# Patient Record
Sex: FEMALE | Race: WHITE | Employment: UNEMPLOYED | URBAN - METROPOLITAN AREA
[De-identification: names, ages, dates, MRNs, and addresses within clinical notes are randomized per-mention and may not be internally consistent; named-entity substitution may affect disease eponyms.]

---

## 2017-05-25 ENCOUNTER — APPOINTMENT (OUTPATIENT)
Dept: GENERAL RADIOLOGY | Age: 27
End: 2017-05-25
Attending: EMERGENCY MEDICINE
Payer: MEDICAID

## 2017-05-25 ENCOUNTER — HOSPITAL ENCOUNTER (EMERGENCY)
Age: 27
Discharge: HOME | End: 2017-05-25
Attending: EMERGENCY MEDICINE
Payer: MEDICAID

## 2017-05-25 VITALS
SYSTOLIC BLOOD PRESSURE: 112 MMHG | TEMPERATURE: 98.2 F | WEIGHT: 165 LBS | HEART RATE: 98 BPM | RESPIRATION RATE: 26 BRPM | DIASTOLIC BLOOD PRESSURE: 74 MMHG | HEIGHT: 62 IN | OXYGEN SATURATION: 98 % | BODY MASS INDEX: 30.36 KG/M2

## 2017-05-25 DIAGNOSIS — Z72.0 TOBACCO USE: ICD-10-CM

## 2017-05-25 DIAGNOSIS — J20.9 ACUTE BRONCHITIS WITH BRONCHOSPASM: Primary | ICD-10-CM

## 2017-05-25 PROCEDURE — 99283 EMERGENCY DEPT VISIT LOW MDM: CPT

## 2017-05-25 PROCEDURE — 71020 XR CHEST PA LAT: CPT

## 2017-05-25 PROCEDURE — 74011000250 HC RX REV CODE- 250: Performed by: EMERGENCY MEDICINE

## 2017-05-25 PROCEDURE — 77030013140 HC MSK NEB VYRM -A

## 2017-05-25 PROCEDURE — 74011250636 HC RX REV CODE- 250/636: Performed by: PHYSICIAN ASSISTANT

## 2017-05-25 PROCEDURE — 94640 AIRWAY INHALATION TREATMENT: CPT

## 2017-05-25 RX ORDER — ALBUTEROL SULFATE 1.25 MG/3ML
1.25 SOLUTION RESPIRATORY (INHALATION)
Qty: 25 EACH | Refills: 0 | Status: SHIPPED | OUTPATIENT
Start: 2017-05-25

## 2017-05-25 RX ORDER — ALBUTEROL SULFATE 90 UG/1
2 AEROSOL, METERED RESPIRATORY (INHALATION)
Qty: 1 INHALER | Refills: 1 | Status: SHIPPED | OUTPATIENT
Start: 2017-05-25

## 2017-05-25 RX ORDER — AZITHROMYCIN 250 MG/1
TABLET, FILM COATED ORAL
Qty: 6 TAB | Refills: 0 | Status: SHIPPED | OUTPATIENT
Start: 2017-05-25 | End: 2017-05-30

## 2017-05-25 RX ORDER — PREDNISONE 10 MG/1
TABLET ORAL
Qty: 43 TAB | Refills: 0 | Status: SHIPPED | OUTPATIENT
Start: 2017-05-25 | End: 2017-08-01

## 2017-05-25 RX ORDER — FLUTICASONE PROPIONATE AND SALMETEROL 250; 50 UG/1; UG/1
1 POWDER RESPIRATORY (INHALATION) EVERY 12 HOURS
COMMUNITY
End: 2017-07-06

## 2017-05-25 RX ORDER — IPRATROPIUM BROMIDE AND ALBUTEROL SULFATE 2.5; .5 MG/3ML; MG/3ML
3 SOLUTION RESPIRATORY (INHALATION)
Status: COMPLETED | OUTPATIENT
Start: 2017-05-25 | End: 2017-05-25

## 2017-05-25 RX ORDER — HYDROCODONE POLISTIREX AND CHLORPHENIRAMINE POLISTIREX 10; 8 MG/5ML; MG/5ML
5 SUSPENSION, EXTENDED RELEASE ORAL
Qty: 115 ML | Refills: 0 | Status: SHIPPED | OUTPATIENT
Start: 2017-05-25 | End: 2017-07-06

## 2017-05-25 RX ORDER — DEXAMETHASONE 4 MG/1
10 TABLET ORAL ONCE
Status: COMPLETED | OUTPATIENT
Start: 2017-05-25 | End: 2017-05-25

## 2017-05-25 RX ADMIN — IPRATROPIUM BROMIDE AND ALBUTEROL SULFATE 3 ML: .5; 3 SOLUTION RESPIRATORY (INHALATION) at 16:34

## 2017-05-25 RX ADMIN — DEXAMETHASONE 10 MG: 4 TABLET ORAL at 17:44

## 2017-05-25 NOTE — DISCHARGE INSTRUCTIONS
Bronchitis: Care Instructions  Your Care Instructions    Bronchitis is inflammation of the bronchial tubes, which carry air to the lungs. The tubes swell and produce mucus, or phlegm. The mucus and inflamed bronchial tubes make you cough. You may have trouble breathing. Most cases of bronchitis are caused by viruses like those that cause colds. Antibiotics usually do not help and they may be harmful. Bronchitis usually develops rapidly and lasts about 2 to 3 weeks in otherwise healthy people. Follow-up care is a key part of your treatment and safety. Be sure to make and go to all appointments, and call your doctor if you are having problems. It's also a good idea to know your test results and keep a list of the medicines you take. How can you care for yourself at home? · Take all medicines exactly as prescribed. Call your doctor if you think you are having a problem with your medicine. · Get some extra rest.  · Take an over-the-counter pain medicine, such as acetaminophen (Tylenol), ibuprofen (Advil, Motrin), or naproxen (Aleve) to reduce fever and relieve body aches. Read and follow all instructions on the label. · Do not take two or more pain medicines at the same time unless the doctor told you to. Many pain medicines have acetaminophen, which is Tylenol. Too much acetaminophen (Tylenol) can be harmful. · Take an over-the-counter cough medicine that contains dextromethorphan to help quiet a dry, hacking cough so that you can sleep. Avoid cough medicines that have more than one active ingredient. Read and follow all instructions on the label. · Breathe moist air from a humidifier, hot shower, or sink filled with hot water. The heat and moisture will thin mucus so you can cough it out. · Do not smoke. Smoking can make bronchitis worse. If you need help quitting, talk to your doctor about stop-smoking programs and medicines. These can increase your chances of quitting for good.   When should you call for help? Call 911 anytime you think you may need emergency care. For example, call if:  · You have severe trouble breathing. Call your doctor now or seek immediate medical care if:  · You have new or worse trouble breathing. · You cough up dark brown or bloody mucus (sputum). · You have a new or higher fever. · You have a new rash. Watch closely for changes in your health, and be sure to contact your doctor if:  · You cough more deeply or more often, especially if you notice more mucus or a change in the color of your mucus. · You are not getting better as expected. Where can you learn more? Go to http://ralph-júnior.info/. Enter H333 in the search box to learn more about \"Bronchitis: Care Instructions. \"  Current as of: May 23, 2016  Content Version: 11.2  © 0248-3969 [employer]. Care instructions adapted under license by [employer] (which disclaims liability or warranty for this information). If you have questions about a medical condition or this instruction, always ask your healthcare professional. [address] any warranty or liability for your use of this information.

## 2017-05-25 NOTE — ED PROVIDER NOTES
HPI Comments: 4:18 PM   Vandana Armendariz is a 27 y.o. Female with hx of asthma presenting to the ED c/o cough productive of yellow and gray sputum x 6 days. Associated sxs include rhinorrhea, sore throat, and chest tightness. Reports she has been evaluated by her PCP who rx'd Robitussin and she has not had relief with this. Pt also has Nebulizer at home, and inhaler. Reports positive sick contacts at home. Admits to smoking 1.5 ppd cigarettes x 15 years, and states she is currently trying to quit for the 5th time. Denies fever, wheezing, and any other sxs or complaints. PCP: Hosea Butts MD      Patient is a 27 y.o. female presenting with cough. The history is provided by the patient. The history is limited by a developmental delay. Cough   This is a new problem. The current episode started more than 2 days ago. The cough is productive of sputum. There has been no fever. Associated symptoms include rhinorrhea and sore throat. Pertinent negatives include no chest pain, no chills, no headaches, no shortness of breath, no wheezing, no nausea and no vomiting. She is a smoker. Her past medical history is significant for asthma. Past Medical History:   Diagnosis Date    Asthma     Lung abnormality     Other and unspecified ovarian cysts        Past Surgical History:   Procedure Laterality Date    HX PELVIC LAPAROSCOPY           History reviewed. No pertinent family history. Social History     Social History    Marital status:      Spouse name: N/A    Number of children: N/A    Years of education: N/A     Occupational History    Not on file.      Social History Main Topics    Smoking status: Current Every Day Smoker     Packs/day: 1.50    Smokeless tobacco: Not on file    Alcohol use Yes    Drug use: Not on file    Sexual activity: Yes     Birth control/ protection: Pill     Other Topics Concern    Not on file     Social History Narrative         ALLERGIES: Latex    Review of Systems   Constitutional: Negative for chills and fever. HENT: Positive for rhinorrhea and sore throat. Negative for congestion. Respiratory: Positive for cough and chest tightness. Negative for shortness of breath and wheezing. Cardiovascular: Negative for chest pain. Gastrointestinal: Negative for nausea and vomiting. Genitourinary: Negative for decreased urine volume. Skin: Negative for rash. Neurological: Negative for light-headedness and headaches. Hematological: Negative for adenopathy. Vitals:    05/25/17 1605   BP: 112/74   Pulse: 98   Resp: 26   Temp: 98.2 °F (36.8 °C)   SpO2: 98%   Weight: 74.8 kg (165 lb)   Height: 5' 2\" (1.575 m)            Physical Exam   Constitutional: She is oriented to person, place, and time. She appears well-developed and well-nourished. No distress.  female in NAD. No resp distress or acc muscle use. HENT:   Head: Normocephalic and atraumatic. Right Ear: External ear normal. No swelling or tenderness. Tympanic membrane is not perforated, not erythematous and not bulging. Left Ear: External ear normal. No swelling or tenderness. Tympanic membrane is not perforated, not erythematous and not bulging. Nose: Mucosal edema and rhinorrhea present. Right sinus exhibits no maxillary sinus tenderness and no frontal sinus tenderness. Left sinus exhibits no maxillary sinus tenderness and no frontal sinus tenderness. Mouth/Throat: Uvula is midline and mucous membranes are normal. No oral lesions. No trismus in the jaw. No dental abscesses or uvula swelling. Posterior oropharyngeal erythema present. No oropharyngeal exudate, posterior oropharyngeal edema or tonsillar abscesses. Eyes: Conjunctivae are normal. Right eye exhibits no discharge. Left eye exhibits no discharge. No scleral icterus. Neck: Normal range of motion. Neck supple. Cardiovascular: Normal rate, regular rhythm, normal heart sounds and intact distal pulses.   Exam reveals no gallop and no friction rub. No murmur heard. Pulmonary/Chest: Effort normal and breath sounds normal. No accessory muscle usage. No tachypnea. No respiratory distress. She has no decreased breath sounds. She has no wheezes. She has no rhonchi. She has no rales. Musculoskeletal: Normal range of motion. Lymphadenopathy:     She has no cervical adenopathy. Neurological: She is alert and oriented to person, place, and time. Skin: Skin is warm and dry. No rash noted. She is not diaphoretic. No erythema. Psychiatric: She has a normal mood and affect. Judgment normal.   Nursing note and vitals reviewed. RESULTS:    XR CHEST PA LAT   Final Result   IMPRESSION:  Minimal linear atelectasis at the left lung base, without acute  radiographic cardiopulmonary abnormality. As read by the radiologist.         Labs Reviewed - No data to display    No results found for this or any previous visit (from the past 12 hour(s)). MDM  Number of Diagnoses or Management Options  Acute bronchitis with bronchospasm:   Tobacco use:   Diagnosis management comments: URI, strep, mono, sinusitis, influenza, PNA, bronchitis, asthma, allergic       Amount and/or Complexity of Data Reviewed  Tests in the radiology section of CPT®: ordered and reviewed (CXR)      ED Course     MEDICATIONS GIVEN:  Medications   albuterol-ipratropium (DUO-NEB) 2.5 MG-0.5 MG/3 ML (3 mL Nebulization Given 5/25/17 1634)   dexamethasone (DECADRON) tablet 10 mg (10 mg Oral Given 5/25/17 1744)     Procedures    PROGRESS NOTE:  4:18 PM  Initial assessment performed. Afebrile. Lungs CTAB. No resp distress or acc muscle use. Will tx for bronchitis with spasm. Need for smoking cessation discussed. Reasons to RTED discussed with pt. All questions answered. Pt feels comfortable going home at this time. Pt expressed understanding and she agrees with plan. DISCHARGE NOTE:  5:27 PM   Silvina Lockett's  results have been reviewed with her.   She has been counseled regarding her diagnosis, treatment, and plan. She verbally conveys understanding and agreement of the signs, symptoms, diagnosis, treatment and prognosis and additionally agrees to follow up as discussed. She also agrees with the care-plan and conveys that all of her questions have been answered. I have also provided discharge instructions for her that include: educational information regarding their diagnosis and treatment, and list of reasons why they would want to return to the ED prior to their follow-up appointment, should her condition change. The patient has been provided with education for proper Emergency Department utilization. CLINICAL IMPRESSION:    1. Acute bronchitis with bronchospasm    2. Tobacco use        PLAN: DISCHARGE HOME    Follow-up Information     Follow up With Details Comments Kelly Brito MD Schedule an appointment as soon as possible for a visit in 5 days for primary care follow up [address] [place] [address] [address] [facility] EMERGENCY DEPT Go to As needed, If symptoms worsen [address] [address]  [phone]          Discharge Medication List as of 5/25/2017  5:27 PM      START taking these medications    Details   azithromycin (ZITHROMAX Z-JESSICA) 250 mg tablet Take 2 pills on day 1 and 1 pill on days 2-5, Print, Disp-6 Tab, R-0      chlorpheniramine-HYDROcodone (TUSSIONEX PENNKINETIC ER) 10-8 mg/5 mL suspension Take 5 mL by mouth every twelve (12) hours as needed for Cough. Max Daily Amount: 10 mL., Print, Disp-115 mL, R-0      predniSONE (DELTASONE) 10 mg tablet Take 6 pills for 2 days, 5 pills for 2 days, 4 pills for 2 days, 3 pills for 2 days, 2 pills for 2 days, 1 pill for 2 days, 1/2 pill for 2 days. Take with food. , Print, Disp-43 Tab, R-0      albuterol (ACCUNEB) 1.25 mg/3 mL nebu Take 3 mL by inhalation every four (4) hours as needed (wheezing). , Print, Disp-25 Each, R-0 CONTINUE these medications which have CHANGED    Details   albuterol (PROVENTIL HFA, VENTOLIN HFA, PROAIR HFA) 90 mcg/actuation inhaler Take 2 Puffs by inhalation every four (4) hours as needed for Wheezing or Shortness of Breath., Print, Disp-1 Inhaler, R-1         CONTINUE these medications which have NOT CHANGED    Details   fluticasone-salmeterol (ADVAIR DISKUS) 250-50 mcg/dose diskus inhaler Take 1 Puff by inhalation every twelve (12) hours. , Historical Med      tiotropium (SPIRIVA WITH HANDIHALER) 18 mcg inhalation capsule Take 1 Cap by inhalation daily. , Historical Med      propranolol (INDERAL) 10 mg tablet Take  by mouth three (3) times daily. , Historical Med      zolpidem (AMBIEN) 5 mg tablet Take  by mouth nightly as needed. Historical Med      SUMAtriptan (IMITREX) 50 mg tablet Take 50 mg by mouth once as needed. Historical Med, 50 mg                 SCRIBE ATTESTATION STATEMENT  Documented by:Chasidy Guevara for and in the presence of Phil Brice PA-C.     PROVIDER ATTESTATION STATEMENT  I personally performed the services described in the documentation, reviewed the documentation, as recorded by the scribe in my presence, and it accurately and completely records my words and actions.   Phil Brice PA-C.

## 2017-07-05 PROBLEM — N28.1 RENAL CYST, ACQUIRED, LEFT: Status: ACTIVE | Noted: 2017-07-05

## 2017-07-05 PROBLEM — M54.50 CHRONIC LOW BACK PAIN: Status: ACTIVE | Noted: 2017-07-05

## 2017-07-05 PROBLEM — G89.29 CHRONIC LOW BACK PAIN: Status: ACTIVE | Noted: 2017-07-05

## 2017-07-06 PROBLEM — N32.81 OAB (OVERACTIVE BLADDER): Status: ACTIVE | Noted: 2017-07-06

## 2017-07-06 PROBLEM — R35.1 NOCTURIA: Status: ACTIVE | Noted: 2017-07-06

## 2017-07-06 PROBLEM — R39.14 FEELING OF INCOMPLETE BLADDER EMPTYING: Status: ACTIVE | Noted: 2017-07-06

## 2017-07-06 PROBLEM — R35.0 URINARY FREQUENCY: Status: ACTIVE | Noted: 2017-07-06

## 2017-07-06 PROBLEM — N39.3 SUI (STRESS URINARY INCONTINENCE, FEMALE): Status: ACTIVE | Noted: 2017-07-06

## 2017-07-06 PROBLEM — Z87.440 HISTORY OF RECURRENT UTIS: Status: ACTIVE | Noted: 2017-07-06

## 2017-08-01 ENCOUNTER — HOSPITAL ENCOUNTER (EMERGENCY)
Age: 27
Discharge: HOME | End: 2017-08-01
Attending: EMERGENCY MEDICINE
Payer: MEDICAID

## 2017-08-01 ENCOUNTER — APPOINTMENT (OUTPATIENT)
Dept: GENERAL RADIOLOGY | Age: 27
End: 2017-08-01
Attending: EMERGENCY MEDICINE
Payer: MEDICAID

## 2017-08-01 VITALS
HEIGHT: 63 IN | BODY MASS INDEX: 31.01 KG/M2 | WEIGHT: 175 LBS | SYSTOLIC BLOOD PRESSURE: 108 MMHG | OXYGEN SATURATION: 100 % | HEART RATE: 84 BPM | RESPIRATION RATE: 18 BRPM | TEMPERATURE: 98.3 F | DIASTOLIC BLOOD PRESSURE: 63 MMHG

## 2017-08-01 DIAGNOSIS — S90.32XA CONTUSION OF LEFT FOOT INCLUDING TOES, INITIAL ENCOUNTER: Primary | ICD-10-CM

## 2017-08-01 DIAGNOSIS — S90.122A CONTUSION OF LEFT FOOT INCLUDING TOES, INITIAL ENCOUNTER: Primary | ICD-10-CM

## 2017-08-01 PROCEDURE — 73630 X-RAY EXAM OF FOOT: CPT

## 2017-08-01 PROCEDURE — 99282 EMERGENCY DEPT VISIT SF MDM: CPT

## 2017-08-01 RX ORDER — KETOROLAC TROMETHAMINE 10 MG/1
10 TABLET, FILM COATED ORAL EVERY 8 HOURS
Qty: 15 TAB | Refills: 0 | Status: SHIPPED | OUTPATIENT
Start: 2017-08-01 | End: 2017-08-06

## 2017-08-02 NOTE — DISCHARGE INSTRUCTIONS
Bruises: Care Instructions  Your Care Instructions    Bruises occur when small blood vessels under the skin tear or rupture, most often from a twist, bump, or fall. Blood leaks into tissues under the skin and causes a black-and-blue spot that often turns colors, including purplish black, reddish blue, or yellowish green, as the bruise heals. Bruises hurt, but most are not serious and will go away on their own within 2 to 4 weeks. Sometimes, gravity causes them to spread down the body. A leg bruise usually will take longer to heal than a bruise on the face or arms. Follow-up care is a key part of your treatment and safety. Be sure to make and go to all appointments, and call your doctor if you are having problems. Its also a good idea to know your test results and keep a list of the medicines you take. How can you care for yourself at home? · Take pain medicines exactly as directed. ¨ If the doctor gave you a prescription medicine for pain, take it as prescribed. ¨ If you are not taking a prescription pain medicine, ask your doctor if you can take an over-the-counter medicine. · Put ice or a cold pack on the area for 10 to 20 minutes at a time. Put a thin cloth between the ice and your skin. · If you can, prop up the bruised area on pillows as much as possible for the next few days. Try to keep the bruise above the level of your heart. When should you call for help? Call your doctor now or seek immediate medical care if:  · You have signs of infection, such as:  ¨ Increased pain, swelling, warmth, or redness. ¨ Red streaks leading from the bruise. ¨ Pus draining from the bruise. ¨ A fever. · You have a bruise on your leg and signs of a blood clot, such as:  ¨ Increasing redness and swelling along with warmth, tenderness, and pain in the bruised area. ¨ Pain in your calf, back of the knee, thigh, or groin. ¨ Redness and swelling in your leg or groin. · Your pain gets worse.   Watch closely for changes in your health, and be sure to contact your doctor if:  · You do not get better as expected. Where can you learn more? Go to http://ralph-júnior.info/. Enter [phone] in the search box to learn more about \"Bruises: Care Instructions. \"  Current as of: March 20, 2017  Content Version: 11.3  © 6984-4825 [email or web address]. Care instructions adapted under license by [employer] (which disclaims liability or warranty for this information). If you have questions about a medical condition or this instruction, always ask your healthcare professional. [place] [address] any warranty or liability for your use of this information.

## 2017-08-02 NOTE — ED TRIAGE NOTES
Injured right fourth and fifth toe, jammed into recliner while walking, +bruising and pain, injured about one hour ago

## 2017-08-02 NOTE — ED PROVIDER NOTES
HPI Comments: 9:55 PM    Radha Garcia is a 27 y.o. female with pertinent PMHx of chronic pain presenting ambulatory to the ED c/o right diffuse toe pain s/p injury just PTA in the ED. Pt states that she jammed her toes against a chair. Pt denies any history of significant right foot injury. Pt specifically denies any fever/chills. PCP: Silvia Franco MD  Social Hx: + tobacco use, + alcohol use, - illicit drug use    There are no other complaints, changes, or physical findings at this time. The history is provided by the patient. No  was used. Past Medical History:   Diagnosis Date    5 cm Left Renal Cyst (incidental finding mr l spine) 7/5/2017    Asthma     Chronic low back pain 7/5/2017    Lung abnormality     Migraine     Other and unspecified ovarian cysts        Past Surgical History:   Procedure Laterality Date    HX PELVIC LAPAROSCOPY           History reviewed. No pertinent family history. Social History     Social History    Marital status:      Spouse name: N/A    Number of children: N/A    Years of education: N/A     Occupational History    Not on file. Social History Main Topics    Smoking status: Current Every Day Smoker     Packs/day: 1.00    Smokeless tobacco: Never Used      Comment: uro cancer encouraged cessation    Alcohol use Yes    Drug use: Not on file    Sexual activity: Yes     Birth control/ protection: Pill     Other Topics Concern    Not on file     Social History Narrative         ALLERGIES: Latex    Review of Systems   Constitutional: Negative for chills and fever. Musculoskeletal: Positive for myalgias (right foot/toes). All other systems reviewed and are negative. Vitals:    08/01/17 2102   BP: 108/63   Pulse: 84   Resp: 18   Temp: 98.3 °F (36.8 °C)   SpO2: 100%   Weight: 79.4 kg (175 lb)   Height: 5' 2.5\" (1.588 m)            Physical Exam   Constitutional: She is oriented to person, place, and time.  She appears well-developed and well-nourished. No distress. HENT:   Head: Normocephalic and atraumatic. Eyes: EOM are normal. Pupils are equal, round, and reactive to light. Neck: Normal range of motion. Neck supple. Cardiovascular: Normal rate, normal heart sounds and intact distal pulses. Pulmonary/Chest: Effort normal and breath sounds normal. No respiratory distress. Abdominal: Soft. Bowel sounds are normal. She exhibits no distension. There is no tenderness. Musculoskeletal: Normal range of motion. She exhibits no edema. Slight swelling diffusely to the toes of the right foot   Neurological: She is alert and oriented to person, place, and time. Skin: Skin is warm and dry. No rash noted. Psychiatric: She has a normal mood and affect. Her behavior is normal.   Nursing note and vitals reviewed. RESULTS:    PULSE OXIMETRY NOTE:  Pulse-ox is 100% on RA  Interpretation: NML       XR FOOT RT MIN 3 V    (Results Pending)      9:57 PM  RADIOLOGY FINDINGS  Right foot X-ray shows no acute abnormality  Pending review by Radiologist  Recorded by Chang Vargas ED Scribe, as dictated by Tonya Lindsey MD.     Labs Reviewed - No data to display    No results found for this or any previous visit (from the past 12 hour(s)). MDM  Number of Diagnoses or Management Options     Amount and/or Complexity of Data Reviewed  Tests in the radiology section of CPT®: ordered and reviewed (XR right foot)  Review and summarize past medical records: yes  Independent visualization of images, tracings, or specimens: yes (XR right foot)      ED Course     Medications - No data to display     Procedures    PROGRESS NOTE:  9:55 PM  Initial assessment performed. Written by Chang Vargas ED Scribe, as dictated by Tonya Lindsey MD.    DISCHARGE NOTE:  10:02 PM  The patient is ready for discharge.  The patient's signs, symptoms, diagnosis, and discharge instructions have been discussed and the patient and/or family has conveyed their understanding. The patient and/or family is to follow up as recommended or return to the ER should their symptoms worsen. Plan has been discussed and the patient and/or family is in agreement. Written by SIGIFREDO Rey, as dictated by Gilmer Millan MD.     CLINICAL IMPRESSION:  1. Contusion of left foot including toes, initial encounter        PLAN:  1. D/C Home    2. Current Discharge Medication List      START taking these medications    Details   ketorolac (TORADOL) 10 mg tablet Take 1 Tab by mouth every eight (8) hours for 5 days. Qty: 15 Tab, Refills: 0             3.   Follow-up Information     Follow up With Details Comments Contact Megan Gan MD Schedule an appointment as soon as possible for a visit for PCP follow up, as needed [address] [place] [address] [address] [facility] EMERGENCY DEPT  As needed, If symptoms worsen [address] [place] [address]  [phone]          SCRIBE ATTESTATION:  This note is prepared by Hoda Coulter, acting as Scribe for [employer], MD.    PROVIDER ATTESTATION:  [employer], MD: The scribe's documentation has been prepared under my direction and personally reviewed by me in its entirety. I confirm that the note above accurately reflects all work, treatment, procedures, and medical decision making performed by me.

## 2017-08-10 PROBLEM — R39.15 URGENCY OF URINATION: Status: ACTIVE | Noted: 2017-08-10

## 2017-10-02 PROBLEM — M62.89 PELVIC FLOOR DYSFUNCTION: Status: ACTIVE | Noted: 2017-10-02

## 2018-05-10 ENCOUNTER — HOSPITAL ENCOUNTER (EMERGENCY)
Age: 28
Discharge: HOME | End: 2018-05-10
Attending: EMERGENCY MEDICINE
Payer: COMMERCIAL

## 2018-05-10 VITALS
SYSTOLIC BLOOD PRESSURE: 120 MMHG | RESPIRATION RATE: 16 BRPM | DIASTOLIC BLOOD PRESSURE: 80 MMHG | HEART RATE: 85 BPM | OXYGEN SATURATION: 100 % | TEMPERATURE: 98.3 F

## 2018-05-10 DIAGNOSIS — L30.9 DERMATITIS: Primary | ICD-10-CM

## 2018-05-10 PROCEDURE — 99282 EMERGENCY DEPT VISIT SF MDM: CPT

## 2018-05-10 RX ORDER — CEPHALEXIN 500 MG/1
500 CAPSULE ORAL 2 TIMES DAILY
Qty: 14 CAP | Refills: 0 | Status: SHIPPED | OUTPATIENT
Start: 2018-05-10 | End: 2018-05-17

## 2018-05-10 RX ORDER — MONTELUKAST SODIUM 10 MG/1
10 TABLET ORAL DAILY
COMMUNITY
End: 2020-02-17

## 2018-05-10 RX ORDER — BUDESONIDE AND FORMOTEROL FUMARATE DIHYDRATE 160; 4.5 UG/1; UG/1
2 AEROSOL RESPIRATORY (INHALATION) 2 TIMES DAILY
COMMUNITY

## 2018-05-10 NOTE — ED PROVIDER NOTES
EMERGENCY DEPARTMENT HISTORY AND PHYSICAL EXAM    Date: 5/10/2018  Patient Name: Netsor Núñez    History of Presenting Illness     Chief Complaint   Patient presents with    Rash         History Provided By: Patient    Chief Complaint: rash  Duration: 1 Months  Timing:  Worsening  Location: scalp and neck  Quality: painful and pruritic  Modifying Factors: Clobetasol cream without relief  Associated Symptoms: \"small ball\" to the left neck    Additional History (Context):   10:09 AM   Nestor Núñez is a 32 y.o. female who presents to the emergency department C/O painful, pruritic rash to the scalp starting 7 years ago and worsening 1 month ago. The affected area has spread to the back of the neck 1 week ago. Reports she is followed by a dermatologist and was prescribed Clobetasol cream; next appointment is in 1 month. Pt has also applied Neosporin without relief. Associated sxs include headaches, and a painful \"small ball\" to the left neck. Reports cigarette use of 1 ppd, and occasional etoh use. Pt denies fever, chills, and any other sxs or complaints. PCP: Alex Lyons MD    Current Outpatient Prescriptions   Medication Sig Dispense Refill    budesonide-formoterol (SYMBICORT) 160-4.5 mcg/actuation HFAA Take 2 Puffs by inhalation two (2) times a day.  montelukast (SINGULAIR) 10 mg tablet Take 10 mg by mouth daily.  cephALEXin (KEFLEX) 500 mg capsule Take 1 Cap by mouth two (2) times a day for 7 days. 14 Cap 0    ibuprofen (MOTRIN) 800 mg tablet       EPINEPHrine (EPIPEN) 0.3 mg/0.3 mL injection   2    tiotropium (SPIRIVA WITH HANDIHALER) 18 mcg inhalation capsule Take 1 Cap by inhalation daily.  albuterol (PROVENTIL HFA, VENTOLIN HFA, PROAIR HFA) 90 mcg/actuation inhaler Take 2 Puffs by inhalation every four (4) hours as needed for Wheezing or Shortness of Breath. 1 Inhaler 1    albuterol (ACCUNEB) 1.25 mg/3 mL nebu Take 3 mL by inhalation every four (4) hours as needed (wheezing). 25 Each 0    SUMAtriptan (IMITREX) 50 mg tablet Take 50 mg by mouth once as needed. Past History     Past Medical History:  Past Medical History:   Diagnosis Date    5 cm Left Renal Cyst (incidental finding mr l spine) 7/5/2017    Asthma     Chronic low back pain 7/5/2017    Lung abnormality     Migraine     Other and unspecified ovarian cysts        Past Surgical History:  Past Surgical History:   Procedure Laterality Date    HX PELVIC LAPAROSCOPY         Family History:  History reviewed. No pertinent family history. Social History:  Social History   Substance Use Topics    Smoking status: Current Every Day Smoker     Packs/day: 1.00    Smokeless tobacco: Never Used      Comment: uro cancer encouraged cessation    Alcohol use Yes      Comment: occ       Allergies: Allergies   Allergen Reactions    Latex Rash         Review of Systems   Review of Systems   Constitutional: Negative for chills and fever. Musculoskeletal: Positive for neck pain (\"small ball\" to the left neck). Skin: Positive for rash (scalp, posterior neck). Neurological: Positive for headaches. All other systems reviewed and are negative. Physical Exam     Vitals:    05/10/18 1012   BP: 120/80   Pulse: 85   Resp: 16   Temp: 98.3 °F (36.8 °C)   SpO2: 100%     Physical Exam   Constitutional: She is oriented to person, place, and time. She appears well-developed and well-nourished. No distress. HENT:   Head: Normocephalic and atraumatic. Eyes: Conjunctivae and EOM are normal. Pupils are equal, round, and reactive to light. Neck: Normal range of motion. Neck supple. Cardiovascular: Normal rate and regular rhythm. Pulmonary/Chest: Effort normal and breath sounds normal.   Musculoskeletal: Normal range of motion. Lymphadenopathy:        Head (left side): Occipital adenopathy present. She has cervical adenopathy. Left cervical: Superficial cervical adenopathy present.    Small slightly tender moveable lymph nodes left occipital & cervical reactive from scalp wounds   Neurological: She is alert and oriented to person, place, and time. Skin: Skin is warm and dry. Lesion noted. Few small crusting papules posterior scalp and few small pustules upper back; NO vesicles or abscess   Psychiatric: She has a normal mood and affect. Her behavior is normal.   Nursing note and vitals reviewed. Diagnostic Study Results     Labs -   No results found for this or any previous visit (from the past 12 hour(s)). Radiologic Studies -   No orders to display     Medications given in the ED-  Medications - No data to display      Medical Decision Making   I am the first provider for this patient. I reviewed the vital signs, available nursing notes, past medical history, past surgical history, family history and social history. Vital Signs-Reviewed the patient's vital signs. Pulse Oximetry Analysis - 100% on room air     Records Reviewed: Nursing Notes    Procedures:  Procedures    ED Course:   10:09 AM Initial assessment performed. The patients presenting problems have been discussed, and they are in agreement with the care plan formulated and outlined with them. I have encouraged them to ask questions as they arise throughout their visit. Diagnosis and Disposition       DISCHARGE NOTE:  10:16 AM   Silvina Lockett's  results have been reviewed with her. She has been counseled regarding her diagnosis, treatment, and plan. She verbally conveys understanding and agreement of the signs, symptoms, diagnosis, treatment and prognosis and additionally agrees to follow up as discussed. She also agrees with the care-plan and conveys that all of her questions have been answered.   I have also provided discharge instructions for her that include: educational information regarding their diagnosis and treatment, and list of reasons why they would want to return to the ED prior to their follow-up appointment, should her condition change. She has been provided with education for proper emergency department utilization. CLINICAL IMPRESSION:    1. Dermatitis        PLAN:  1. D/C Home  2. Current Discharge Medication List      START taking these medications    Details   cephALEXin (KEFLEX) 500 mg capsule Take 1 Cap by mouth two (2) times a day for 7 days. Qty: 14 Cap, Refills: 0           3. Follow-up Information     Follow up With Details Comments [address] [place] [address] [facility] Schedule an appointment as soon as possible for a visit in 2 days Or your dermatologist for follow up [address] [facility].[address] [id number]  [phone]    [facility] EMERGENCY DEPT  As needed, If symptoms worsen [address] [address]  [phone]        _______________________________    Attestations: This note is prepared by Shea Meek, acting as Scribe for Rock Abbi PA-C. Rock Abbi PA-C:  The scribe's documentation has been prepared under my direction and personally reviewed by me in its entirety.   I confirm that the note above accurately reflects all work, treatment, procedures, and medical decision making performed by me.  _______________________________

## 2018-05-10 NOTE — DISCHARGE INSTRUCTIONS
Dermatitis: Care Instructions  Your Care Instructions  Dermatitis is the general name used for any rash or inflammation of the skin. Different kinds of dermatitis cause different kinds of rashes. Common causes of a rash include new medicines, plants (such as poison oak or poison ivy), heat, and stress. Certain illnesses can also cause a rash. An allergic reaction to something that touches your skin, such as latex, nickel, or poison ivy, is called contact dermatitis. Contact dermatitis may also be caused by something that irritates the skin, such as bleach, a chemical, or soap. These types of rashes cannot be spread from person to person. How long your rash will last depends on what caused it. Rashes may last a few days or months. Follow-up care is a key part of your treatment and safety. Be sure to make and go to all appointments, and call your doctor if you are having problems. It's also a good idea to know your test results and keep a list of the medicines you take. How can you care for yourself at home? · Do not scratch the rash. Cut your nails short, and file them smooth. Or wear gloves if this helps keep you from scratching. · Wash the area with water only. Pat dry. · Put cold, wet cloths on the rash to reduce itching. · Keep cool, and stay out of the sun. · Leave the rash open to the air as much as possible. · If the rash itches, use hydrocortisone cream. Follow the directions on the label. Calamine lotion may help for plant rashes. · Take an over-the-counter antihistamine, such as diphenhydramine (Benadryl) or loratadine (Claritin), to help calm the itching. Read and follow all instructions on the label. · If your doctor prescribed a cream, use it as directed. If your doctor prescribed medicine, take it exactly as directed. When should you call for help?   Call your doctor now or seek immediate medical care if:  ? · You have symptoms of infection, such as:  ¨ Increased pain, swelling, warmth, or redness. ¨ Red streaks leading from the area. ¨ Pus draining from the area. ¨ A fever. ? · You have joint pain along with the rash. ? Watch closely for changes in your health, and be sure to contact your doctor if:  ? · Your rash is changing or getting worse. ? · You are not getting better as expected. Where can you learn more? Go to http://ralph-júnior.info/. Enter [phone] in the search box to learn more about \"Dermatitis: Care Instructions. \"  Current as of: October 13, 2016  Content Version: 11.4  © 1665-0022 [employer]. Care instructions adapted under license by [employer] (which disclaims liability or warranty for this information). If you have questions about a medical condition or this instruction, always ask your healthcare professional. [address] any warranty or liability for your use of this information.

## 2018-05-10 NOTE — ED TRIAGE NOTES
Pt states has rash on head and neck, pt states onset of rash about 7 years ago but rash is worse over the last month, pt seen by dermatologist and given Clobetasol cream, next appointment in 1 month. Pt states rash is now on neck and noted to lumps on lt side of neck.  Pt c/o itching, pain and stinging

## 2018-09-14 ENCOUNTER — HOSPITAL ENCOUNTER (EMERGENCY)
Age: 28
Discharge: HOME | End: 2018-09-14
Attending: EMERGENCY MEDICINE
Payer: COMMERCIAL

## 2018-09-14 ENCOUNTER — APPOINTMENT (OUTPATIENT)
Dept: CT IMAGING | Age: 28
End: 2018-09-14
Attending: PHYSICIAN ASSISTANT
Payer: COMMERCIAL

## 2018-09-14 VITALS
WEIGHT: 160 LBS | OXYGEN SATURATION: 100 % | SYSTOLIC BLOOD PRESSURE: 131 MMHG | BODY MASS INDEX: 28.35 KG/M2 | HEART RATE: 80 BPM | DIASTOLIC BLOOD PRESSURE: 81 MMHG | TEMPERATURE: 97.9 F | HEIGHT: 63 IN | RESPIRATION RATE: 16 BRPM

## 2018-09-14 DIAGNOSIS — R10.9 LEFT FLANK PAIN: ICD-10-CM

## 2018-09-14 DIAGNOSIS — N20.1 URETEROLITHIASIS: Primary | ICD-10-CM

## 2018-09-14 LAB
ALBUMIN SERPL-MCNC: 3.8 G/DL (ref 3.4–5)
ALBUMIN/GLOB SERPL: 1.2 {RATIO} (ref 0.8–1.7)
ALP SERPL-CCNC: 76 U/L (ref 45–117)
ALT SERPL-CCNC: 15 U/L (ref 13–56)
ANION GAP SERPL CALC-SCNC: 11 MMOL/L (ref 3–18)
APPEARANCE UR: ABNORMAL
AST SERPL-CCNC: 12 U/L (ref 15–37)
BACTERIA URNS QL MICRO: ABNORMAL /HPF
BASOPHILS # BLD: 0 K/UL (ref 0–0.1)
BASOPHILS NFR BLD: 0 % (ref 0–2)
BILIRUB SERPL-MCNC: 0.3 MG/DL (ref 0.2–1)
BILIRUB UR QL: ABNORMAL
BUN SERPL-MCNC: 11 MG/DL (ref 7–18)
BUN/CREAT SERPL: 14 (ref 12–20)
CALCIUM SERPL-MCNC: 8.7 MG/DL (ref 8.5–10.1)
CHLORIDE SERPL-SCNC: 104 MMOL/L (ref 100–108)
CO2 SERPL-SCNC: 25 MMOL/L (ref 21–32)
COLOR UR: ABNORMAL
CREAT SERPL-MCNC: 0.77 MG/DL (ref 0.6–1.3)
DIFFERENTIAL METHOD BLD: NORMAL
EOSINOPHIL # BLD: 0.2 K/UL (ref 0–0.4)
EOSINOPHIL NFR BLD: 2 % (ref 0–5)
EPITH CASTS URNS QL MICRO: ABNORMAL /LPF (ref 0–5)
ERYTHROCYTE [DISTWIDTH] IN BLOOD BY AUTOMATED COUNT: 12.6 % (ref 11.6–14.5)
GLOBULIN SER CALC-MCNC: 3.2 G/DL (ref 2–4)
GLUCOSE SERPL-MCNC: 94 MG/DL (ref 74–99)
GLUCOSE UR STRIP.AUTO-MCNC: NEGATIVE MG/DL
HCG UR QL: NEGATIVE
HCT VFR BLD AUTO: 41.8 % (ref 35–45)
HGB BLD-MCNC: 14 G/DL (ref 12–16)
HGB UR QL STRIP: ABNORMAL
KETONES UR QL STRIP.AUTO: ABNORMAL MG/DL
LEUKOCYTE ESTERASE UR QL STRIP.AUTO: NEGATIVE
LYMPHOCYTES # BLD: 3.1 K/UL (ref 0.9–3.6)
LYMPHOCYTES NFR BLD: 32 % (ref 21–52)
MCH RBC QN AUTO: 30.4 PG (ref 24–34)
MCHC RBC AUTO-ENTMCNC: 33.5 G/DL (ref 31–37)
MCV RBC AUTO: 90.9 FL (ref 74–97)
MONOCYTES # BLD: 0.4 K/UL (ref 0.05–1.2)
MONOCYTES NFR BLD: 4 % (ref 3–10)
NEUTS SEG # BLD: 6.1 K/UL (ref 1.8–8)
NEUTS SEG NFR BLD: 62 % (ref 40–73)
NITRITE UR QL STRIP.AUTO: NEGATIVE
PH UR STRIP: 6 [PH] (ref 5–8)
PLATELET # BLD AUTO: 294 K/UL (ref 135–420)
PMV BLD AUTO: 9.8 FL (ref 9.2–11.8)
POTASSIUM SERPL-SCNC: 3.8 MMOL/L (ref 3.5–5.5)
PROT SERPL-MCNC: 7 G/DL (ref 6.4–8.2)
PROT UR STRIP-MCNC: >300 MG/DL
RBC # BLD AUTO: 4.6 M/UL (ref 4.2–5.3)
RBC #/AREA URNS HPF: ABNORMAL /HPF (ref 0–5)
SERVICE CMNT-IMP: NORMAL
SODIUM SERPL-SCNC: 140 MMOL/L (ref 136–145)
SP GR UR REFRACTOMETRY: 1.02 (ref 1–1.03)
UROBILINOGEN UR QL STRIP.AUTO: 1 EU/DL (ref 0.2–1)
WBC # BLD AUTO: 9.8 K/UL (ref 4.6–13.2)
WBC URNS QL MICRO: ABNORMAL /HPF (ref 0–5)
WET PREP GENITAL: NORMAL

## 2018-09-14 PROCEDURE — 81001 URINALYSIS AUTO W/SCOPE: CPT | Performed by: PHYSICIAN ASSISTANT

## 2018-09-14 PROCEDURE — 99284 EMERGENCY DEPT VISIT MOD MDM: CPT

## 2018-09-14 PROCEDURE — 80053 COMPREHEN METABOLIC PANEL: CPT | Performed by: PHYSICIAN ASSISTANT

## 2018-09-14 PROCEDURE — 87491 CHLMYD TRACH DNA AMP PROBE: CPT | Performed by: PHYSICIAN ASSISTANT

## 2018-09-14 PROCEDURE — 81025 URINE PREGNANCY TEST: CPT

## 2018-09-14 PROCEDURE — 87210 SMEAR WET MOUNT SALINE/INK: CPT | Performed by: PHYSICIAN ASSISTANT

## 2018-09-14 PROCEDURE — 85025 COMPLETE CBC W/AUTO DIFF WBC: CPT | Performed by: PHYSICIAN ASSISTANT

## 2018-09-14 PROCEDURE — 96361 HYDRATE IV INFUSION ADD-ON: CPT

## 2018-09-14 PROCEDURE — 74011250636 HC RX REV CODE- 250/636: Performed by: PHYSICIAN ASSISTANT

## 2018-09-14 PROCEDURE — 96375 TX/PRO/DX INJ NEW DRUG ADDON: CPT

## 2018-09-14 PROCEDURE — 96374 THER/PROPH/DIAG INJ IV PUSH: CPT

## 2018-09-14 PROCEDURE — 74176 CT ABD & PELVIS W/O CONTRAST: CPT

## 2018-09-14 RX ORDER — ONDANSETRON 2 MG/ML
4 INJECTION INTRAMUSCULAR; INTRAVENOUS
Status: COMPLETED | OUTPATIENT
Start: 2018-09-14 | End: 2018-09-14

## 2018-09-14 RX ORDER — OXYCODONE AND ACETAMINOPHEN 5; 325 MG/1; MG/1
1 TABLET ORAL
Qty: 18 TAB | Refills: 0 | Status: SHIPPED | OUTPATIENT
Start: 2018-09-14 | End: 2020-02-17

## 2018-09-14 RX ORDER — TAMSULOSIN HYDROCHLORIDE 0.4 MG/1
0.4 CAPSULE ORAL DAILY
Qty: 15 CAP | Refills: 0 | Status: SHIPPED | OUTPATIENT
Start: 2018-09-14 | End: 2018-09-29

## 2018-09-14 RX ORDER — ONDANSETRON 4 MG/1
4 TABLET, ORALLY DISINTEGRATING ORAL
Qty: 20 TAB | Refills: 0 | Status: SHIPPED | OUTPATIENT
Start: 2018-09-14 | End: 2020-02-17

## 2018-09-14 RX ORDER — DEXLANSOPRAZOLE 60 MG/1
CAPSULE, DELAYED RELEASE ORAL
COMMUNITY
End: 2020-02-17

## 2018-09-14 RX ORDER — KETOROLAC TROMETHAMINE 15 MG/ML
15 INJECTION, SOLUTION INTRAMUSCULAR; INTRAVENOUS
Status: COMPLETED | OUTPATIENT
Start: 2018-09-14 | End: 2018-09-14

## 2018-09-14 RX ADMIN — SODIUM CHLORIDE 1000 ML: 900 INJECTION, SOLUTION INTRAVENOUS at 21:46

## 2018-09-14 RX ADMIN — KETOROLAC TROMETHAMINE 15 MG: 15 INJECTION, SOLUTION INTRAMUSCULAR; INTRAVENOUS at 21:52

## 2018-09-14 RX ADMIN — ONDANSETRON 4 MG: 2 INJECTION, SOLUTION INTRAMUSCULAR; INTRAVENOUS at 21:52

## 2018-09-15 NOTE — ED PROVIDER NOTES
EMERGENCY DEPARTMENT HISTORY AND PHYSICAL EXAM 
 
Date: 9/14/2018 Patient Name: Zully Dozier History of Presenting Illness Chief Complaint Patient presents with  Pelvic Pain History Provided By: Patient Chief Complaint: pelvic pain and flank pain Duration: ~12 Hours Timing:  Constant Location: left sided Severity: 7 out of 10 Associated Symptoms: hematuria (though unsure due to recent menstrual cycle)/vaginal bleeding and baseline nausea Additional History (Context):  
 
9:03 PM 
 
Zully Dozier is a 32 y.o. female with pertinent PSHx of pelvic laparoscopy; PMHx of ovarian cysts, presenting ambulatory to the ED c/o 7/10 left sided pelvic pain radiating to her flank x ~12 hours. Pt states that she has a hx of similar pain with an ovarian cyst. Pt notes associated symptoms of hematuria (though unsure due to recent menstrual cycle)/vaginal bleeding and baseline nausea. Pt has had multiple BMs today. Pt denies any concern for STDs or recent new sexual exposures. Pt's LMP began 7 days ago and ended 3-4 days ago. Pt specifically denies any dysuria, fever/chills, or vomiting. PCP: Silvia Valera MD 
Pt does not do illicit drugs. She admits to smoking tobacco regularly There are no other complaints, changes, or physical findings at this time. Current Outpatient Prescriptions Medication Sig Dispense Refill  Dexlansoprazole (DEXILANT) 60 mg CpDB Take  by mouth.  tamsulosin (FLOMAX) 0.4 mg capsule Take 1 Cap by mouth daily for 15 days. 15 Cap 0  
 ondansetron (ZOFRAN ODT) 4 mg disintegrating tablet Take 1 Tab by mouth every eight (8) hours as needed for Nausea. 20 Tab 0  
 oxyCODONE-acetaminophen (PERCOCET) 5-325 mg per tablet Take 1 Tab by mouth every six (6) hours as needed for Pain. Max Daily Amount: 4 Tabs. 18 Tab 0  
 budesonide-formoterol (SYMBICORT) 160-4.5 mcg/actuation HFAA Take 2 Puffs by inhalation two (2) times a day.  montelukast (SINGULAIR) 10 mg tablet Take 10 mg by mouth daily.  ibuprofen (MOTRIN) 800 mg tablet  EPINEPHrine (EPIPEN) 0.3 mg/0.3 mL injection   2  
 tiotropium (SPIRIVA WITH HANDIHALER) 18 mcg inhalation capsule Take 1 Cap by inhalation daily.  albuterol (PROVENTIL HFA, VENTOLIN HFA, PROAIR HFA) 90 mcg/actuation inhaler Take 2 Puffs by inhalation every four (4) hours as needed for Wheezing or Shortness of Breath. 1 Inhaler 1  
 albuterol (ACCUNEB) 1.25 mg/3 mL nebu Take 3 mL by inhalation every four (4) hours as needed (wheezing). 25 Each 0  
 SUMAtriptan (IMITREX) 50 mg tablet Take 50 mg by mouth once as needed. Past History Past Medical History: 
Past Medical History:  
Diagnosis Date  5 cm Left Renal Cyst (incidental finding mr l spine) 7/5/2017  Asthma  Chronic low back pain 7/5/2017  Lung abnormality  Migraine  Other and unspecified ovarian cysts Past Surgical History: 
Past Surgical History:  
Procedure Laterality Date  HX PELVIC LAPAROSCOPY Family History: 
History reviewed. No pertinent family history. Social History: 
Social History Substance Use Topics  Smoking status: Current Every Day Smoker Packs/day: 1.00  Smokeless tobacco: Never Used Comment: uro cancer encouraged cessation  Alcohol use Yes Comment: occ Allergies: Allergies Allergen Reactions  Latex Rash Review of Systems Review of Systems Constitutional: Negative for chills and fever. Gastrointestinal: Positive for abdominal pain (left lower) and nausea (baseline). Negative for vomiting. Genitourinary: Positive for flank pain (left), hematuria and vaginal bleeding. Negative for dysuria. All other systems reviewed and are negative. Physical Exam  
 
Vitals:  
 09/14/18 2057 09/14/18 2317 BP: 131/81 Pulse: (!) 101 80 Resp: 16 16 Temp: 97.9 °F (36.6 °C) SpO2: 95% 100% Weight: 72.6 kg (160 lb) Height: 5' 2.5\" (1.588 m) Physical Exam  
Constitutional: She is oriented to person, place, and time. She appears well-developed and well-nourished. She appears distressed (mild pain distress holding left flank). HENT:  
Head: Normocephalic and atraumatic. Eyes: Conjunctivae and EOM are normal. Pupils are equal, round, and reactive to light. Neck: Normal range of motion. Neck supple. Cardiovascular: Normal rate and regular rhythm. Pulmonary/Chest: Effort normal and breath sounds normal.  
Abdominal: Soft. Bowel sounds are normal. She exhibits no distension. There is tenderness. There is CVA tenderness. There is no rebound and no guarding. Genitourinary: Uterus is not tender. Cervix exhibits no motion tenderness. No bleeding in the vagina. No vaginal discharge found. Musculoskeletal: Normal range of motion. Neurological: She is alert and oriented to person, place, and time. Skin: Skin is warm and dry. Psychiatric: She has a normal mood and affect. Her behavior is normal.  
Nursing note and vitals reviewed. Diagnostic Study Results Labs - Recent Results (from the past 12 hour(s)) WET PREP Collection Time: 09/14/18  9:20 PM  
Result Value Ref Range Special Requests: NO SPECIAL REQUESTS Wet prep NO YEAST,TRICHOMONAS OR CLUE CELLS NOTED URINALYSIS W/ RFLX MICROSCOPIC Collection Time: 09/14/18  9:30 PM  
Result Value Ref Range Color RED Appearance CLOUDY Specific gravity 1.025 1.003 - 1.030    
 pH (UA) 6.0 5.0 - 8.0 Protein >300 (A) NEG mg/dL Glucose NEGATIVE  NEG mg/dL Ketone TRACE (A) NEG mg/dL Bilirubin MODERATE (A) NEG Blood LARGE (A) NEG Urobilinogen 1.0 0.2 - 1.0 EU/dL Nitrites NEGATIVE  NEG Leukocyte Esterase NEGATIVE  NEG    
URINE MICROSCOPIC ONLY Collection Time: 09/14/18  9:30 PM  
Result Value Ref Range  WBC 0 to 3 0 - 5 /hpf  
 RBC TOO NUMEROUS TO COUNT 0 - 5 /hpf  
 Epithelial cells FEW 0 - 5 /lpf Bacteria 1+ (A) NEG /hpf  
HCG URINE, QL. - POC Collection Time: 09/14/18  9:35 PM  
Result Value Ref Range Pregnancy test,urine (POC) NEGATIVE  NEG    
METABOLIC PANEL, COMPREHENSIVE Collection Time: 09/14/18  9:40 PM  
Result Value Ref Range Sodium 140 136 - 145 mmol/L Potassium 3.8 3.5 - 5.5 mmol/L Chloride 104 100 - 108 mmol/L  
 CO2 25 21 - 32 mmol/L Anion gap 11 3.0 - 18 mmol/L Glucose 94 74 - 99 mg/dL BUN 11 7.0 - 18 MG/DL Creatinine 0.77 0.6 - 1.3 MG/DL  
 BUN/Creatinine ratio 14 12 - 20 GFR est AA >60 >60 ml/min/1.73m2 GFR est non-AA >60 >60 ml/min/1.73m2 Calcium 8.7 8.5 - 10.1 MG/DL Bilirubin, total 0.3 0.2 - 1.0 MG/DL  
 ALT (SGPT) 15 13 - 56 U/L  
 AST (SGOT) 12 (L) 15 - 37 U/L Alk. phosphatase 76 45 - 117 U/L Protein, total 7.0 6.4 - 8.2 g/dL Albumin 3.8 3.4 - 5.0 g/dL Globulin 3.2 2.0 - 4.0 g/dL A-G Ratio 1.2 0.8 - 1.7    
CBC WITH AUTOMATED DIFF Collection Time: 09/14/18  9:40 PM  
Result Value Ref Range WBC 9.8 4.6 - 13.2 K/uL  
 RBC 4.60 4.20 - 5.30 M/uL  
 HGB 14.0 12.0 - 16.0 g/dL HCT 41.8 35.0 - 45.0 % MCV 90.9 74.0 - 97.0 FL  
 MCH 30.4 24.0 - 34.0 PG  
 MCHC 33.5 31.0 - 37.0 g/dL  
 RDW 12.6 11.6 - 14.5 % PLATELET 396 439 - 140 K/uL MPV 9.8 9.2 - 11.8 FL  
 NEUTROPHILS 62 40 - 73 % LYMPHOCYTES 32 21 - 52 % MONOCYTES 4 3 - 10 % EOSINOPHILS 2 0 - 5 % BASOPHILS 0 0 - 2 %  
 ABS. NEUTROPHILS 6.1 1.8 - 8.0 K/UL  
 ABS. LYMPHOCYTES 3.1 0.9 - 3.6 K/UL  
 ABS. MONOCYTES 0.4 0.05 - 1.2 K/UL  
 ABS. EOSINOPHILS 0.2 0.0 - 0.4 K/UL  
 ABS. BASOPHILS 0.0 0.0 - 0.1 K/UL  
 DF AUTOMATED Radiologic Studies -  
CT ABD PELV WO CONT Final Result CT Results  (Last 48 hours) 09/14/18 2008  CT ABD PELV WO CONT Final result Impression:  IMPRESSION:  
   
2 mm partially obstructing stone left upper ureter at the L3 level. Large cyst seen extending from the lower pole left kidney. Narrative:  EXAM: CT ABDOMEN AND PELVIS:  
   
INDICATION: Left flank pain, hematuria COMPARISON: None. TECHNIQUE: Axial CT imaging of the abdomen and pelvis was performed after  
intravenous contrast administration only. Multiplanar reformats were generated. One or more dose reduction techniques were used on this CT: automated exposure  
control, adjustment of the mAs and/or kVp according to patient's size, and  
iterative reconstruction techniques. The specific techniques utilized on this CT  
exam have been documented in the patient's electronic medical record.   
_______________ FINDINGS:  
   
LOWER CHEST: Linear discoid atelectasis or scarring seen in the lingula. LIVER, BILIARY: Liver is normal. No biliary dilation. Gallbladder is  
unremarkable. PANCREAS: Normal.  
   
SPLEEN: Normal.  
   
ADRENALS: Normal.  
   
KIDNEYS/URETERS/BLADDER: Mild left-sided hydronephrosis and upper hydroureter  
noted due to a 2 mm ureteral stone at the L3 level. There is a large cyst  
extending from the lower pole left kidney measuring 5.8 cm x 5.1 cm. The right  
kidney appears unremarkable. The bladder appears unremarkable. PELVIC ORGANS: Retroverted uterus present VASCULATURE: Unremarkable LYMPH NODES: No enlarged lymph nodes. GASTROINTESTINAL TRACT: No bowel dilation or wall thickening. The appendix  
appears normal.  
   
BONES: No acute or aggressive osseous abnormalities identified. OTHER: Tiny umbilical hernia contains only fat.  
   
_______________ Medical Decision Making I am the first provider for this patient. I reviewed the vital signs, available nursing notes, past medical history, past surgical history, family history and social history. Vital Signs-Reviewed the patient's vital signs.  
 
Pulse Oximetry Analysis - 95% on RA  
 
 Records Reviewed: Nursing Notes and Old Medical Records PROCEDURES: 
Pelvic Exam 
Date/Time: 9/14/2018 11:19 PM 
Performed by: PA Type of exam performed: bimanual and speculum. External genitalia appearance: normal.   
Vaginal exam:  normal.   
Cervical exam:  no cervical motion tenderness. Specimen(s) collected:  chlamydia and GC. Bimanual exam:  normal.   
Patient tolerance: Patient tolerated the procedure well with no immediate complications MEDICATIONS GIVEN IN THE ED: 
Medications  
ketorolac (TORADOL) injection 15 mg (15 mg IntraVENous Given 9/14/18 2152) [facility]ansetron [facility]) injection 4 mg (4 mg IntraVENous Given 9/14/18 2152)  
sodium chloride 0.9 % bolus infusion 1,000 mL (0 mL IntraVENous IV Completed 9/14/18 2240) ED COURSE:  
9:03 PM  
Initial assessment performed. Tobacco Counseling 9:05 PM  
Discussed the risks of smoking and the benefits of smoking cessation as well as the long term sequelae of smoking with the patient. The patient verbalized their understanding. PROGRESS NOTE: 
11:00 PM 
Pt and/or family have been updated on their results. Pt and/or pt's family are aware of the plan of care and are in agreement. Written by Luigi Goldsmith [address] [facility] Scribe, as dictated by Lance Cunha PA-C. Diagnosis and Disposition DISCHARGE NOTE: 
11:06 PM 
The patient is ready for discharge. The patient's signs, symptoms, diagnosis, and discharge instructions have been discussed and the patient and/or family has conveyed their understanding. The patient and/or family is to follow up as recommended or return to the ER should their symptoms worsen. Plan has been discussed and the patient and/or family is in agreement. Written by Luigi Goldsmith [address] [facility], ED Scribe, as dictated by Lance Cunha PA-C.  
 
CLINICAL IMPRESSION: 
1. Ureterolithiasis 2. Left flank pain PLAN: 
1. D/C Home 2. Current Discharge Medication List  
  
START taking these medications Details tamsulosin (FLOMAX) 0.4 mg capsule Take 1 Cap by mouth daily for 15 days. Qty: 15 Cap, Refills: 0 Associated Diagnoses: Left flank pain  
  
ondansetron (ZOFRAN ODT) 4 mg disintegrating tablet Take 1 Tab by mouth every eight (8) hours as needed for Nausea. Qty: 20 Tab, Refills: 0  
  
oxyCODONE-acetaminophen (PERCOCET) 5-325 mg per tablet Take 1 Tab by mouth every six (6) hours as needed for Pain. Max Daily Amount: 4 Tabs. Qty: 18 Tab, Refills: 0 Associated Diagnoses: Ureterolithiasis 3. Follow-up Information Follow up With Details Comments Contact Info Magan Carrera MD Schedule an appointment as soon as possible for a visit for urology follow up, as needed [address] [place] [address] [address] [place] [address] 
[phone] [facility] EMERGENCY DEPT  As needed, If symptoms worsen [address] [place] [address] 
[phone]  
  
 
_______________________________ Attestations: This note is prepared by Merly Lee, acting as Scribe for Kiran Beck PA-C. Kiran Beck PA-C:  The scribe's documentation has been prepared under my direction and personally reviewed by me in its entirety. I confirm that the note above accurately reflects all work, treatment, procedures, and medical decision making performed by me. 
_______________________________

## 2018-09-15 NOTE — ED TRIAGE NOTES
Left lower pelvic pain since this am and blood in urine. States that she has had hx of ovarian cyst that felt similar. Radiation in to left back.

## 2018-09-15 NOTE — DISCHARGE INSTRUCTIONS
Kidney Stone: Care Instructions  Your Care Instructions    Kidney stones are formed when salts, minerals, and other substances normally found in the urine clump together. They can be as small as grains of sand or, rarely, as large as golf balls. While the stone is traveling through the ureter, which is the tube that carries urine from the kidney to the bladder, you will probably feel pain. The pain may be mild or very severe. You may also have some blood in your urine. As soon as the stone reaches the bladder, any intense pain should go away. If a stone is too large to pass on its own, you may need a medical procedure to help you pass the stone. The doctor has checked you carefully, but problems can develop later. If you notice any problems or new symptoms, get medical treatment right away. Follow-up care is a key part of your treatment and safety. Be sure to make and go to all appointments, and call your doctor if you are having problems. It's also a good idea to know your test results and keep a list of the medicines you take. How can you care for yourself at home? · Drink plenty of fluids, enough so that your urine is light yellow or clear like water. If you have kidney, heart, or liver disease and have to limit fluids, talk with your doctor before you increase the amount of fluids you drink. · Take pain medicines exactly as directed. Call your doctor if you think you are having a problem with your medicine. ¨ If the doctor gave you a prescription medicine for pain, take it as prescribed. ¨ If you are not taking a prescription pain medicine, ask your doctor if you can take an over-the-counter medicine. Read and follow all instructions on the label. · Your doctor may ask you to strain your urine so that you can collect your kidney stone when it passes. You can use a kitchen strainer or a tea strainer to catch the stone. Store it in a plastic bag until you see your doctor again.   Preventing future kidney stones  Some changes in your diet may help prevent kidney stones. Depending on the cause of your stones, your doctor may recommend that you:  · Drink plenty of fluids, enough so that your urine is light yellow or clear like water. If you have kidney, heart, or liver disease and have to limit fluids, talk with your doctor before you increase the amount of fluids you drink. · Limit coffee, tea, and alcohol. Also avoid grapefruit juice. · Do not take more than the recommended daily dose of vitamins C and D.  · Avoid antacids such as Gaviscon, Maalox, Mylanta, or Tums. · Limit the amount of salt (sodium) in your diet. · Eat a balanced diet that is not too high in protein. · Limit foods that are high in a substance called oxalate, which can cause kidney stones. These foods include dark green vegetables, rhubarb, chocolate, wheat bran, nuts, cranberries, and beans. When should you call for help? Call your doctor now or seek immediate medical care if:    · You cannot keep down fluids.     · Your pain gets worse.     · You have a fever or chills.     · You have new or worse pain in your back just below your rib cage (the flank area).     · You have new or more blood in your urine.    Watch closely for changes in your health, and be sure to contact your doctor if:    · You do not get better as expected. Where can you learn more? Go to http://ralph-júnior.info/. Enter T425 in the search box to learn more about \"Kidney Stone: Care Instructions. \"  Current as of: May 12, 2017  Content Version: 11.7  © 0109-9140 [employer]. Care instructions adapted under license by [employer] (which disclaims liability or warranty for this information). If you have questions about a medical condition or this instruction, always ask your healthcare professional. [address] any warranty or liability for your use of this information.

## 2018-09-18 LAB
C TRACH RRNA SPEC QL NAA+PROBE: NEGATIVE
N GONORRHOEA RRNA SPEC QL NAA+PROBE: NEGATIVE
SPECIMEN SOURCE: NORMAL

## 2020-02-07 ENCOUNTER — HOSPITAL ENCOUNTER (EMERGENCY)
Age: 30
Discharge: HOME | End: 2020-02-07
Attending: EMERGENCY MEDICINE
Payer: COMMERCIAL

## 2020-02-07 VITALS
OXYGEN SATURATION: 99 % | WEIGHT: 160 LBS | SYSTOLIC BLOOD PRESSURE: 109 MMHG | DIASTOLIC BLOOD PRESSURE: 57 MMHG | TEMPERATURE: 99.3 F | HEIGHT: 63 IN | HEART RATE: 98 BPM | RESPIRATION RATE: 19 BRPM | BODY MASS INDEX: 28.35 KG/M2

## 2020-02-07 DIAGNOSIS — Z72.0 TOBACCO USE: ICD-10-CM

## 2020-02-07 DIAGNOSIS — R68.89 FLU-LIKE SYMPTOMS: Primary | ICD-10-CM

## 2020-02-07 DIAGNOSIS — Z87.09 HISTORY OF ASTHMA: ICD-10-CM

## 2020-02-07 DIAGNOSIS — Z71.6 TOBACCO ABUSE COUNSELING: ICD-10-CM

## 2020-02-07 LAB
FLUAV AG NPH QL IA: NEGATIVE
FLUBV AG NOSE QL IA: NEGATIVE

## 2020-02-07 PROCEDURE — 99282 EMERGENCY DEPT VISIT SF MDM: CPT

## 2020-02-07 PROCEDURE — 87804 INFLUENZA ASSAY W/OPTIC: CPT

## 2020-02-07 RX ORDER — CARISOPRODOL 350 MG/1
350 TABLET ORAL
Status: DISCONTINUED | OUTPATIENT
Start: 2020-02-07 | End: 2020-02-08 | Stop reason: HOSPADM

## 2020-02-07 RX ORDER — CEPHALEXIN 500 MG/1
500 CAPSULE ORAL 4 TIMES DAILY
Qty: 40 CAP | Refills: 0 | Status: SHIPPED | OUTPATIENT
Start: 2020-02-07 | End: 2020-02-17

## 2020-02-07 RX ORDER — IBUPROFEN 800 MG/1
800 TABLET ORAL
Qty: 30 TAB | Refills: 0 | Status: SHIPPED | OUTPATIENT
Start: 2020-02-07 | End: 2020-02-17

## 2020-02-07 RX ORDER — ONDANSETRON 4 MG/1
4 TABLET, FILM COATED ORAL
Qty: 12 TAB | Refills: 0 | Status: SHIPPED | OUTPATIENT
Start: 2020-02-07 | End: 2020-02-17

## 2020-02-07 RX ORDER — OSELTAMIVIR PHOSPHATE 75 MG/1
75 CAPSULE ORAL 2 TIMES DAILY
Qty: 10 CAP | Refills: 0 | Status: SHIPPED | OUTPATIENT
Start: 2020-02-07 | End: 2020-02-12

## 2020-02-07 RX ORDER — ACETAMINOPHEN 325 MG/1
650 TABLET ORAL
Qty: 20 TAB | Refills: 0 | Status: SHIPPED | OUTPATIENT
Start: 2020-02-07 | End: 2020-02-17

## 2020-02-07 NOTE — LETTER
NOTIFICATION RETURN TO WORK / SCHOOL 
 
2/7/2020 9:09 PM 
 
Ms. Amairani Butterfield [address] [place] [address] To Whom It May Concern: 
 
Amairani Butterfield is currently under the care of [facility] EMERGENCY DEPT. She has flulike symptoms and other family members have tested positive for the influenza A virus. She will return to work/school after resolution of fevers chills body aches cough and cold and flu symptoms. If there are questions or concerns please have the patient contact our office.  
 
 
 
Sincerely, 
 
 
 
 
Korin Lino MD

## 2020-02-08 NOTE — DISCHARGE INSTRUCTIONS
Influenza (Flu): Care Instructions  Your Care Instructions    Influenza (flu) is an infection in the lungs and breathing passages. It is caused by the influenza virus. There are different strains, or types, of the flu virus from year to year. Unlike the common cold, the flu comes on suddenly and the symptoms, such as a cough, congestion, fever, chills, fatigue, aches, and pains, are more severe. These symptoms may last up to 10 days. Although the flu can make you feel very sick, it usually doesn't cause serious health problems. Home treatment is usually all you need for flu symptoms. But your doctor may prescribe antiviral medicine to prevent other health problems, such as pneumonia, from developing. Older people and those who have a long-term health condition, such as lung disease, are most at risk for having pneumonia or other health problems. Follow-up care is a key part of your treatment and safety. Be sure to make and go to all appointments, and call your doctor if you are having problems. It's also a good idea to know your test results and keep a list of the medicines you take. How can you care for yourself at home? · Get plenty of rest.  · Drink plenty of fluids, enough so that your urine is light yellow or clear like water. If you have kidney, heart, or liver disease and have to limit fluids, talk with your doctor before you increase the amount of fluids you drink. · Take an over-the-counter pain medicine if needed, such as acetaminophen (Tylenol), ibuprofen (Advil, Motrin), or naproxen (Aleve), to relieve fever, headache, and muscle aches. Read and follow all instructions on the label. No one younger than 20 should take aspirin. It has been linked to Reye syndrome, a serious illness. · Do not smoke. Smoking can make the flu worse. If you need help quitting, talk to your doctor about stop-smoking programs and medicines. These can increase your chances of quitting for good.   · Breathe moist air from a hot shower or from a sink filled with hot water to help clear a stuffy nose. · Before you use cough and cold medicines, check the label. These medicines may not be safe for young children or for people with certain health problems. · If the skin around your nose and lips becomes sore, put some petroleum jelly on the area. · To ease coughing:  ? Drink fluids to soothe a scratchy throat. ? Suck on cough drops or plain hard candy. ? Take an over-the-counter cough medicine that contains dextromethorphan to help you get some sleep. Read and follow all instructions on the label. ? Raise your head at night with an extra pillow. This may help you rest if coughing keeps you awake. · Take any prescribed medicine exactly as directed. Call your doctor if you think you are having a problem with your medicine. To avoid spreading the flu  · Wash your hands regularly, and keep your hands away from your face. · Stay home from school, work, and other public places until you are feeling better and your fever has been gone for at least 24 hours. The fever needs to have gone away on its own without the help of medicine. · Ask people living with you to talk to their doctors about preventing the flu. They may get antiviral medicine to keep from getting the flu from you. · To prevent the flu in the future, get a flu vaccine every fall. Encourage people living with you to get the vaccine. · Cover your mouth when you cough or sneeze. When should you call for help? Call 911 anytime you think you may need emergency care.  For example, call if:    · You have severe trouble breathing.    Call your doctor now or seek immediate medical care if:    · You have new or worse trouble breathing.     · You seem to be getting much sicker.     · You feel very sleepy or confused.     · You have a new or higher fever.     · You get a new rash.    Watch closely for changes in your health, and be sure to contact your doctor if:    · You begin to get better and then get worse.     · You are not getting better after 1 week. Where can you learn more? Go to http://ralph-júnior.info/. Enter Q660 in the search box to learn more about \"Influenza (Flu): Care Instructions. \"  Current as of: June 9, 2019  Content Version: 12.2  © 3607-1178 [employer]. Care instructions adapted under license by [employer] (which disclaims liability or warranty for this information). If you have questions about a medical condition or this instruction, always ask your healthcare professional. [address] any warranty or liability for your use of this information. Learning About Benefits From Quitting Smoking  How does quitting smoking make you healthier? If you're thinking about quitting smoking, you may have a few reasons to be smoke-free. Your health may be one of them. · When you quit smoking, you lower your risks for cancer, lung disease, heart attack, stroke, blood vessel disease, and blindness from macular degeneration. · When you're smoke-free, you get sick less often, and you heal faster. You are less likely to get colds, flu, bronchitis, and pneumonia. · As a nonsmoker, you may find that your mood is better and you are less stressed. When and how will you feel healthier? Quitting has real health benefits that start from day 1 of being smoke-free. And the longer you stay smoke-free, the healthier you get and the better you feel. The first hours  · After just 20 minutes, your blood pressure and heart rate go down. That means there's less stress on your heart and blood vessels. · Within 12 hours, the level of carbon monoxide in your blood drops back to normal. That makes room for more oxygen. With more oxygen in your body, you may notice that you have more energy than when you smoked. After 2 weeks  · Your lungs start to work better.   · Your risk of heart attack starts to drop.  After 1 month  · When your lungs are clear, you cough less and breathe deeper, so it's easier to be active. · Your sense of taste and smell return. That means you can enjoy food more than you have since you started smoking. Over the years  · After 1 year, your risk of heart disease is half what it would be if you kept smoking. · After 5 years, your risk of stroke starts to shrink. Within a few years after that, it's about the same as if you'd never smoked. · After 10 years, your risk of dying from lung cancer is cut by about half. And your risk for many other types of cancer is lower too. How would quitting help others in your life? When you quit smoking, you improve the health of everyone who now breathes in your smoke. · Their heart, lung, and cancer risks drop, much like yours. · They are sick less. For babies and small children, living smoke-free means they're less likely to have ear infections, pneumonia, and bronchitis. · If you're a woman who is or will be pregnant someday, quitting smoking means a healthier . · Children who are close to you are less likely to become adult smokers. Where can you learn more? Go to http://ralph-júnior.info/. Enter [id number] [id number] [id number] in the search box to learn more about \"Learning About Benefits From Quitting Smoking. \"  Current as of: 2018  Content Version: 12.2  © 8536-9074 [employer]. Care instructions adapted under license by [employer] (which disclaims liability or warranty for this information). If you have questions about a medical condition or this instruction, always ask your healthcare professional. [place] [address] any warranty or liability for your use of this information.

## 2020-02-08 NOTE — ED PROVIDER NOTES
EMERGENCY DEPARTMENT HISTORY AND PHYSICAL EXAM    Date: 2/7/2020  Patient Name: Liliana Muñiz    History of Presenting Illness     Chief Complaint   Patient presents with    Flu Like Symptoms         History Provided By: Patient    Additional History (Context):   8:52 PM  Liliana Muñiz is a 30 y.o. female with PMHX of asthma and seasonal allergies who presents to the emergency department C/O flu-like sxs onset a few hours ago. Associated sxs include dry cough, generalized body aches, and rhinorrhea. The pt reports she had a self diagnosed sinus infection x1.5 weeks ago and took OTC medications with no relief. The pt took Aleve at 1:30 PM today for the bodyaches with no improvement. NKDA. Denies vomiting, nausea, diarrhea. Social History:  Admits to cigarette use; 1 ppd. Family History:  No significant hx    Surgical History:  No significant hx    PCP: James Gasca MD    Current Facility-Administered Medications   Medication Dose Route Frequency Provider Last Rate Last Dose    carisoprodoL (SOMA) tablet 350 mg  350 mg Oral NOW Savanna Rizzo MD         Current Outpatient Medications   Medication Sig Dispense Refill    ondansetron hcl (ZOFRAN) 4 mg tablet Take 1 Tab by mouth every eight (8) hours as needed for Nausea. 12 Tab 0    cephALEXin (KEFLEX) 500 mg capsule Take 1 Cap by mouth four (4) times daily for 10 days. 40 Cap 0    oseltamivir (TAMIFLU) 75 mg capsule Take 1 Cap by mouth two (2) times a day for 5 days. 10 Cap 0    acetaminophen (TYLENOL) 325 mg tablet Take 2 Tabs by mouth every four (4) hours as needed for Pain. 20 Tab 0    ibuprofen (MOTRIN) 800 mg tablet Take 1 Tab by mouth every eight (8) hours as needed for Pain. 30 Tab 0    Dexlansoprazole (DEXILANT) 60 mg CpDB Take  by mouth.  ondansetron (ZOFRAN ODT) 4 mg disintegrating tablet Take 1 Tab by mouth every eight (8) hours as needed for Nausea.  20 Tab 0    oxyCODONE-acetaminophen (PERCOCET) 5-325 mg per tablet Take 1 Tab by mouth every six (6) hours as needed for Pain. Max Daily Amount: 4 Tabs. 18 Tab 0    budesonide-formoterol (SYMBICORT) 160-4.5 mcg/actuation HFAA Take 2 Puffs by inhalation two (2) times a day.  montelukast (SINGULAIR) 10 mg tablet Take 10 mg by mouth daily.  EPINEPHrine (EPIPEN) 0.3 mg/0.3 mL injection   2    tiotropium (SPIRIVA WITH HANDIHALER) 18 mcg inhalation capsule Take 1 Cap by inhalation daily.  albuterol (PROVENTIL HFA, VENTOLIN HFA, PROAIR HFA) 90 mcg/actuation inhaler Take 2 Puffs by inhalation every four (4) hours as needed for Wheezing or Shortness of Breath. 1 Inhaler 1    albuterol (ACCUNEB) 1.25 mg/3 mL nebu Take 3 mL by inhalation every four (4) hours as needed (wheezing). 25 Each 0    SUMAtriptan (IMITREX) 50 mg tablet Take 50 mg by mouth once as needed. Past History     Past Medical History:  Past Medical History:   Diagnosis Date    5 cm Left Renal Cyst (incidental finding mr l spine) 7/5/2017    Asthma     Chronic low back pain 7/5/2017    Lung abnormality     Migraine     Other and unspecified ovarian cysts        Past Surgical History:  Past Surgical History:   Procedure Laterality Date    HX PELVIC LAPAROSCOPY         Family History:  History reviewed. No pertinent family history. Social History:  Social History     Tobacco Use    Smoking status: Current Every Day Smoker     Packs/day: 1.00    Smokeless tobacco: Never Used    Tobacco comment: uro cancer encouraged cessation   Substance Use Topics    Alcohol use: Yes     Comment: occ    Drug use: No       Allergies: Allergies   Allergen Reactions    Latex Rash         Review of Systems   Review of Systems   Constitutional: Negative for chills, diaphoresis, fever and unexpected weight change. HENT: Negative for congestion, drooling, ear pain, rhinorrhea, sore throat, tinnitus and trouble swallowing. Eyes: Negative for photophobia, pain, redness and visual disturbance.    Respiratory: Negative for cough, choking, chest tightness, shortness of breath, wheezing and stridor. Cardiovascular: Negative for chest pain, palpitations and leg swelling. Gastrointestinal: Negative for abdominal distention, abdominal pain, anal bleeding, blood in stool, constipation, diarrhea, nausea and vomiting. Genitourinary: Negative for difficulty urinating, dysuria, flank pain, frequency, hematuria and urgency. Musculoskeletal: Negative for arthralgias, back pain and neck pain. Skin: Negative for color change, rash and wound. Neurological: Negative for dizziness, seizures, syncope, speech difficulty, light-headedness and headaches. Hematological: Does not bruise/bleed easily. Psychiatric/Behavioral: Negative for agitation, behavioral problems, hallucinations, self-injury and suicidal ideas. The patient is not hyperactive. Physical Exam     Vitals:    02/07/20 2016   BP: 109/57   Pulse: 98   Resp: 19   Temp: 99.3 °F (37.4 °C)   SpO2: 99%   Weight: 72.6 kg (160 lb)   Height: 5' 2.5\" (1.588 m)     Physical Exam  Vitals signs and nursing note reviewed. Constitutional:       General: She is not in acute distress. Appearance: She is well-developed. She is not toxic-appearing or diaphoretic. Comments: Uncomfortable appearing   HENT:      Head: Normocephalic and atraumatic. Right Ear: External ear normal.      Left Ear: External ear normal.      Nose: Congestion present. Mouth/Throat:      Pharynx: No oropharyngeal exudate. Eyes:      General: No scleral icterus. Conjunctiva/sclera: Conjunctivae normal.      Pupils: Pupils are equal, round, and reactive to light. Comments: No pallor   Neck:      Musculoskeletal: Normal range of motion and neck supple. Thyroid: No thyromegaly. Vascular: No JVD. Trachea: No tracheal deviation. Cardiovascular:      Rate and Rhythm: Normal rate and regular rhythm. Heart sounds: Normal heart sounds.    Pulmonary: Effort: Pulmonary effort is normal. No respiratory distress. Breath sounds: Normal breath sounds. No stridor. Abdominal:      General: Bowel sounds are normal. There is no distension. Palpations: Abdomen is soft. Tenderness: There is no abdominal tenderness. There is no guarding or rebound. Musculoskeletal: Normal range of motion. General: No tenderness. Comments: No soft tissue injuries   Lymphadenopathy:      Cervical: No cervical adenopathy. Skin:     General: Skin is warm and dry. Findings: No erythema or rash. Neurological:      Mental Status: She is alert and oriented to person, place, and time. Cranial Nerves: No cranial nerve deficit. Coordination: Coordination normal.      Deep Tendon Reflexes: Reflexes are normal and symmetric. Reflexes normal.   Psychiatric:         Behavior: Behavior normal.         Thought Content: Thought content normal.         Judgment: Judgment normal.           Diagnostic Study Results     Labs -     Recent Results (from the past 12 hour(s))   INFLUENZA A & B AG (RAPID TEST)    Collection Time: 02/07/20  8:20 PM   Result Value Ref Range    Influenza A Antigen NEGATIVE  NEG      Influenza B Antigen NEGATIVE  NEG         Radiologic Studies -  No orders to display     CT Results  (Last 48 hours)    None        CXR Results  (Last 48 hours)    None          Medications given in the ED-  Medications   carisoprodoL (SOMA) tablet 350 mg (has no administration in time range)         Medical Decision Making   I am the first provider for this patient. I reviewed the vital signs, available nursing notes, past medical history, past surgical history, family history and social history. Vital Signs-Reviewed the patient's vital signs. Pulse Oximetry Analysis - 99% on RA     Records Reviewed: NURSING NOTES AND PREVIOUS MEDICAL RECORDS    Provider Notes (Medical Decision Making): DDx:  with positive Flu.  Her sxs storngly suggest the same. The pt is unlikely to have meningitis, PNA, Rhabdo, or changes in kidney function. Will treat her sxs and f/u as out-patient. Procedures:  Procedures    ED Course:   8:52 PM: Initial assessment performed. The patients presenting problems have been discussed, and they are in agreement with the care plan formulated and outlined with them. I have encouraged them to ask questions as they arise throughout their visit. Diagnosis and Disposition       DISCHARGE NOTE:  8:59 PM  Silvina Lockett's  results have been reviewed with her. She has been counseled regarding her diagnosis, treatment, and plan. She verbally conveys understanding and agreement of the signs, symptoms, diagnosis, treatment and prognosis and additionally agrees to follow up as discussed. She also agrees with the care-plan and conveys that all of her questions have been answered. I have also provided discharge instructions for her that include: educational information regarding their diagnosis and treatment, and list of reasons why they would want to return to the ED prior to their follow-up appointment, should her condition change. She has been provided with education for proper emergency department utilization. CLINICAL IMPRESSION:    1. Flu-like symptoms    2. History of asthma    3. Tobacco use    4. Tobacco abuse counseling        PLAN:  1. D/C Home  2. Current Discharge Medication List      START taking these medications    Details   ondansetron hcl (ZOFRAN) 4 mg tablet Take 1 Tab by mouth every eight (8) hours as needed for Nausea. Qty: 12 Tab, Refills: 0      cephALEXin (KEFLEX) 500 mg capsule Take 1 Cap by mouth four (4) times daily for 10 days. Qty: 40 Cap, Refills: 0      oseltamivir (TAMIFLU) 75 mg capsule Take 1 Cap by mouth two (2) times a day for 5 days. Qty: 10 Cap, Refills: 0      acetaminophen (TYLENOL) 325 mg tablet Take 2 Tabs by mouth every four (4) hours as needed for Pain.   Qty: 20 Tab, Refills: 0 CONTINUE these medications which have CHANGED    Details   ibuprofen (MOTRIN) 800 mg tablet Take 1 Tab by mouth every eight (8) hours as needed for Pain. Qty: 30 Tab, Refills: 0         CONTINUE these medications which have NOT CHANGED    Details   Dexlansoprazole (DEXILANT) 60 mg CpDB Take  by mouth.      budesonide-formoterol (SYMBICORT) 160-4.5 mcg/actuation HFAA Take 2 Puffs by inhalation two (2) times a day. montelukast (SINGULAIR) 10 mg tablet Take 10 mg by mouth daily. tiotropium (SPIRIVA WITH HANDIHALER) 18 mcg inhalation capsule Take 1 Cap by inhalation daily. albuterol (PROVENTIL HFA, VENTOLIN HFA, PROAIR HFA) 90 mcg/actuation inhaler Take 2 Puffs by inhalation every four (4) hours as needed for Wheezing or Shortness of Breath. Qty: 1 Inhaler, Refills: 1      albuterol (ACCUNEB) 1.25 mg/3 mL nebu Take 3 mL by inhalation every four (4) hours as needed (wheezing). Qty: 25 Each, Refills: 0      SUMAtriptan (IMITREX) 50 mg tablet Take 50 mg by mouth once as needed. 3.   Follow-up Information     Follow up With Specialties Details Why Frantz Kendrick MD Family Practice Schedule an appointment as soon as possible for a visit in 2 days For primary care follow up [address] [place] [address]      [facility] EMERGENCY DEPT Emergency Medicine Go to As needed, if symptoms worsen [address] [place] [address]  [phone]        _______________________________    This note was partially transcribed via voice recognition software. Although efforts have been made to catch any discrepancies, it may contain sound alike words, grammatical errors, or nonsensical words. _______________________________    Attestations: This note is prepared by Francisco Richardson, acting as Scribe for Kayli Spaulding. Micaela Pacheco MD.    Kayli Spaulding.  Micaela Pacheco MD:  The scribe's documentation has been prepared under my direction and personally reviewed by me in its entirety.   I confirm that the note above accurately reflects all work, treatment, procedures, and medical decision making performed by me.  _______________________________

## 2020-02-17 ENCOUNTER — APPOINTMENT (OUTPATIENT)
Dept: CT IMAGING | Age: 30
End: 2020-02-17
Attending: PHYSICIAN ASSISTANT
Payer: MEDICAID

## 2020-02-17 ENCOUNTER — HOSPITAL ENCOUNTER (EMERGENCY)
Age: 30
Discharge: HOME | End: 2020-02-17
Attending: EMERGENCY MEDICINE
Payer: MEDICAID

## 2020-02-17 VITALS
DIASTOLIC BLOOD PRESSURE: 62 MMHG | TEMPERATURE: 98.3 F | HEIGHT: 63 IN | WEIGHT: 165 LBS | OXYGEN SATURATION: 100 % | HEART RATE: 79 BPM | SYSTOLIC BLOOD PRESSURE: 112 MMHG | BODY MASS INDEX: 29.23 KG/M2 | RESPIRATION RATE: 18 BRPM

## 2020-02-17 DIAGNOSIS — R10.2 PELVIC PAIN: Primary | ICD-10-CM

## 2020-02-17 LAB
ALBUMIN SERPL-MCNC: 3.9 G/DL (ref 3.4–5)
ALBUMIN/GLOB SERPL: 1.3 {RATIO} (ref 0.8–1.7)
ALP SERPL-CCNC: 75 U/L (ref 45–117)
ALT SERPL-CCNC: 18 U/L (ref 13–56)
ANION GAP SERPL CALC-SCNC: 6 MMOL/L (ref 3–18)
APPEARANCE UR: CLEAR
AST SERPL-CCNC: 12 U/L (ref 10–38)
BACTERIA URNS QL MICRO: ABNORMAL /HPF
BASOPHILS # BLD: 0 K/UL (ref 0–0.1)
BASOPHILS NFR BLD: 0 % (ref 0–2)
BILIRUB SERPL-MCNC: 0.3 MG/DL (ref 0.2–1)
BILIRUB UR QL: NEGATIVE
BUN SERPL-MCNC: 8 MG/DL (ref 7–18)
BUN/CREAT SERPL: 11 (ref 12–20)
CALCIUM SERPL-MCNC: 8.8 MG/DL (ref 8.5–10.1)
CHLORIDE SERPL-SCNC: 109 MMOL/L (ref 100–111)
CO2 SERPL-SCNC: 27 MMOL/L (ref 21–32)
COLOR UR: YELLOW
CREAT SERPL-MCNC: 0.71 MG/DL (ref 0.6–1.3)
DIFFERENTIAL METHOD BLD: ABNORMAL
EOSINOPHIL # BLD: 0.2 K/UL (ref 0–0.4)
EOSINOPHIL NFR BLD: 2 % (ref 0–5)
EPITH CASTS URNS QL MICRO: ABNORMAL /LPF (ref 0–5)
ERYTHROCYTE [DISTWIDTH] IN BLOOD BY AUTOMATED COUNT: 13.5 % (ref 11.6–14.5)
GLOBULIN SER CALC-MCNC: 2.9 G/DL (ref 2–4)
GLUCOSE SERPL-MCNC: 99 MG/DL (ref 74–99)
GLUCOSE UR STRIP.AUTO-MCNC: NEGATIVE MG/DL
HCG UR QL: NEGATIVE
HCT VFR BLD AUTO: 45.1 % (ref 35–45)
HGB BLD-MCNC: 14.9 G/DL (ref 12–16)
HGB UR QL STRIP: ABNORMAL
KETONES UR QL STRIP.AUTO: NEGATIVE MG/DL
LEUKOCYTE ESTERASE UR QL STRIP.AUTO: NEGATIVE
LYMPHOCYTES # BLD: 3.1 K/UL (ref 0.9–3.6)
LYMPHOCYTES NFR BLD: 27 % (ref 21–52)
MCH RBC QN AUTO: 30.8 PG (ref 24–34)
MCHC RBC AUTO-ENTMCNC: 33 G/DL (ref 31–37)
MCV RBC AUTO: 93.2 FL (ref 74–97)
MONOCYTES # BLD: 0.5 K/UL (ref 0.05–1.2)
MONOCYTES NFR BLD: 5 % (ref 3–10)
MUCOUS THREADS URNS QL MICRO: ABNORMAL /LPF
NEUTS SEG # BLD: 7.5 K/UL (ref 1.8–8)
NEUTS SEG NFR BLD: 66 % (ref 40–73)
NITRITE UR QL STRIP.AUTO: NEGATIVE
PH UR STRIP: 6.5 [PH] (ref 5–8)
PLATELET # BLD AUTO: 266 K/UL (ref 135–420)
PMV BLD AUTO: 10.4 FL (ref 9.2–11.8)
POTASSIUM SERPL-SCNC: 3.5 MMOL/L (ref 3.5–5.5)
PROT SERPL-MCNC: 6.8 G/DL (ref 6.4–8.2)
PROT UR STRIP-MCNC: NEGATIVE MG/DL
RBC # BLD AUTO: 4.84 M/UL (ref 4.2–5.3)
RBC #/AREA URNS HPF: ABNORMAL /HPF (ref 0–5)
SERVICE CMNT-IMP: NORMAL
SODIUM SERPL-SCNC: 142 MMOL/L (ref 136–145)
SP GR UR REFRACTOMETRY: 1.02 (ref 1–1.03)
UROBILINOGEN UR QL STRIP.AUTO: 1 EU/DL (ref 0.2–1)
WBC # BLD AUTO: 11.3 K/UL (ref 4.6–13.2)
WBC URNS QL MICRO: ABNORMAL /HPF (ref 0–5)
WET PREP GENITAL: NORMAL

## 2020-02-17 PROCEDURE — 81025 URINE PREGNANCY TEST: CPT

## 2020-02-17 PROCEDURE — 85025 COMPLETE CBC W/AUTO DIFF WBC: CPT

## 2020-02-17 PROCEDURE — 87210 SMEAR WET MOUNT SALINE/INK: CPT

## 2020-02-17 PROCEDURE — 81001 URINALYSIS AUTO W/SCOPE: CPT

## 2020-02-17 PROCEDURE — 87491 CHLMYD TRACH DNA AMP PROBE: CPT

## 2020-02-17 PROCEDURE — 74177 CT ABD & PELVIS W/CONTRAST: CPT

## 2020-02-17 PROCEDURE — 74011250637 HC RX REV CODE- 250/637: Performed by: PHYSICIAN ASSISTANT

## 2020-02-17 PROCEDURE — 80053 COMPREHEN METABOLIC PANEL: CPT

## 2020-02-17 PROCEDURE — 99284 EMERGENCY DEPT VISIT MOD MDM: CPT

## 2020-02-17 PROCEDURE — 74011636320 HC RX REV CODE- 636/320: Performed by: EMERGENCY MEDICINE

## 2020-02-17 RX ORDER — PROPRANOLOL HYDROCHLORIDE 20 MG/1
TABLET ORAL 3 TIMES DAILY
COMMUNITY

## 2020-02-17 RX ORDER — IBUPROFEN 800 MG/1
800 TABLET ORAL
Qty: 20 TAB | Refills: 0 | Status: SHIPPED | OUTPATIENT
Start: 2020-02-17 | End: 2020-02-24

## 2020-02-17 RX ORDER — IBUPROFEN 400 MG/1
800 TABLET ORAL
Status: COMPLETED | OUTPATIENT
Start: 2020-02-17 | End: 2020-02-17

## 2020-02-17 RX ADMIN — IBUPROFEN 800 MG: 400 TABLET, FILM COATED ORAL at 21:06

## 2020-02-17 RX ADMIN — IOPAMIDOL 100 ML: 612 INJECTION, SOLUTION INTRAVENOUS at 19:47

## 2020-02-17 NOTE — ED PROVIDER NOTES
EMERGENCY DEPARTMENT HISTORY AND PHYSICAL EXAM    Date: 2/17/2020  Patient Name: Taj Gómez    History of Presenting Illness     No chief complaint on file. History Provided By: Patient    Chief Complaint: vaginal pain       Additional History (Context):   5:25 PM  Taj Gómez is a 30 y.o. female with PMHX ovariain cysts presents to the emergency department C/O vaginal and Rectal pain that is been for the past couple weeks. States the pain is worse after intercourse (vaginal and anal) and after bowel movements. States she is had some trouble with bowel movements recently. She is had no abdominal pain, vaginal discharge, urinary symptoms or fevers. She has a history of ovarian cysts but states this feels different. Last menstrual period was 1/27 but has had some spotting. PCP: Tee Smith MD    Current Outpatient Medications   Medication Sig Dispense Refill    propranolol (INDERAL) 20 mg tablet Take  by mouth three (3) times daily.  ibuprofen (MOTRIN) 800 mg tablet Take 1 Tab by mouth every six (6) hours as needed for Pain for up to 7 days. 20 Tab 0    budesonide-formoterol (SYMBICORT) 160-4.5 mcg/actuation HFAA Take 2 Puffs by inhalation two (2) times a day.  tiotropium (SPIRIVA WITH HANDIHALER) 18 mcg inhalation capsule Take 1 Cap by inhalation daily.  SUMAtriptan (IMITREX) 50 mg tablet Take 50 mg by mouth once as needed.  albuterol (PROVENTIL HFA, VENTOLIN HFA, PROAIR HFA) 90 mcg/actuation inhaler Take 2 Puffs by inhalation every four (4) hours as needed for Wheezing or Shortness of Breath. 1 Inhaler 1    albuterol (ACCUNEB) 1.25 mg/3 mL nebu Take 3 mL by inhalation every four (4) hours as needed (wheezing).  25 Each 0       Past History     Past Medical History:  Past Medical History:   Diagnosis Date    5 cm Left Renal Cyst (incidental finding mr l spine) 7/5/2017    Asthma     Chronic low back pain 7/5/2017    Lung abnormality     Migraine     Other and unspecified ovarian cysts        Past Surgical History:  Past Surgical History:   Procedure Laterality Date    HX PELVIC LAPAROSCOPY         Family History:  History reviewed. No pertinent family history. Social History:  Social History     Tobacco Use    Smoking status: Current Every Day Smoker     Packs/day: 1.00    Smokeless tobacco: Never Used    Tobacco comment: uro cancer encouraged cessation   Substance Use Topics    Alcohol use: Yes     Comment: occ    Drug use: No       Allergies: Allergies   Allergen Reactions    Latex Rash       Review of Systems   Review of Systems   Constitutional: Negative for chills and fever. Respiratory: Negative for shortness of breath. Cardiovascular: Negative for chest pain. Gastrointestinal: Positive for abdominal pain. Negative for diarrhea, nausea and vomiting. Genitourinary: Positive for vaginal pain. Negative for decreased urine volume, difficulty urinating, dysuria, flank pain, frequency, genital sores, hematuria, pelvic pain, urgency, vaginal bleeding and vaginal discharge. Musculoskeletal: Negative for back pain. All other systems reviewed and are negative. Physical Exam     Vitals:    02/17/20 1648 02/17/20 2108   BP: 108/59 112/62   Pulse: 76 79   Resp: 20 18   Temp: 98.3 °F (36.8 °C)    SpO2: 100% 100%   Weight: 74.8 kg (165 lb)    Height: 5' 3\" (1.6 m)      Physical Exam  Vitals signs and nursing note reviewed. Constitutional:       Appearance: She is well-developed. Comments: Alert, Lying on stretcher, no acute distress   HENT:      Head: Normocephalic and atraumatic. Neck:      Musculoskeletal: Normal range of motion and neck supple. Cardiovascular:      Rate and Rhythm: Normal rate and regular rhythm. Heart sounds: Normal heart sounds. No murmur. Pulmonary:      Effort: Pulmonary effort is normal. No respiratory distress. Breath sounds: Normal breath sounds. No wheezing or rales.    Abdominal: General: Bowel sounds are normal.      Palpations: Abdomen is soft. Tenderness: There is abdominal tenderness in the suprapubic area. There is no right CVA tenderness, left CVA tenderness, guarding or rebound. Comments: Mild suprapubic tenderness no guarding or rebound   Genitourinary:     Comments: See pelvic exam procedure note  Rectal exam, no tenderness, no masses palpated  Neurological:      Mental Status: She is alert and oriented to person, place, and time.    Psychiatric:         Judgment: Judgment normal.           Diagnostic Study Results     Labs:     Recent Results (from the past 12 hour(s))   URINALYSIS W/ RFLX MICROSCOPIC    Collection Time: 02/17/20  5:15 PM   Result Value Ref Range    Color YELLOW      Appearance CLEAR      Specific gravity 1.018 1.005 - 1.030      pH (UA) 6.5 5.0 - 8.0      Protein NEGATIVE  NEG mg/dL    Glucose NEGATIVE  NEG mg/dL    Ketone NEGATIVE  NEG mg/dL    Bilirubin NEGATIVE  NEG      Blood TRACE (A) NEG      Urobilinogen 1.0 0.2 - 1.0 EU/dL    Nitrites NEGATIVE  NEG      Leukocyte Esterase NEGATIVE  NEG     HCG URINE, QL    Collection Time: 02/17/20  5:15 PM   Result Value Ref Range    HCG urine, QL NEGATIVE  NEG     URINE MICROSCOPIC ONLY    Collection Time: 02/17/20  5:15 PM   Result Value Ref Range    WBC 0 to 1 0 - 5 /hpf    RBC 0 to 1 0 - 5 /hpf    Epithelial cells FEW 0 - 5 /lpf    Bacteria RARE NEG /hpf    Mucus FEW (A) NEG /lpf   WET PREP    Collection Time: 02/17/20  6:15 PM   Result Value Ref Range    Special Requests: NO SPECIAL REQUESTS      Wet prep NO YEAST,TRICHOMONAS OR CLUE CELLS NOTED     CBC WITH AUTOMATED DIFF    Collection Time: 02/17/20  6:28 PM   Result Value Ref Range    WBC 11.3 4.6 - 13.2 K/uL    RBC 4.84 4.20 - 5.30 M/uL    HGB 14.9 12.0 - 16.0 g/dL    HCT 45.1 (H) 35.0 - 45.0 %    MCV 93.2 74.0 - 97.0 FL    MCH 30.8 24.0 - 34.0 PG    MCHC 33.0 31.0 - 37.0 g/dL    RDW 13.5 11.6 - 14.5 %    PLATELET 672 361 - 556 K/uL    MPV 10.4 9.2 - 11.8 FL    NEUTROPHILS 66 40 - 73 %    LYMPHOCYTES 27 21 - 52 %    MONOCYTES 5 3 - 10 %    EOSINOPHILS 2 0 - 5 %    BASOPHILS 0 0 - 2 %    ABS. NEUTROPHILS 7.5 1.8 - 8.0 K/UL    ABS. LYMPHOCYTES 3.1 0.9 - 3.6 K/UL    ABS. MONOCYTES 0.5 0.05 - 1.2 K/UL    ABS. EOSINOPHILS 0.2 0.0 - 0.4 K/UL    ABS. BASOPHILS 0.0 0.0 - 0.1 K/UL    DF AUTOMATED     METABOLIC PANEL, COMPREHENSIVE    Collection Time: 02/17/20  6:28 PM   Result Value Ref Range    Sodium 142 136 - 145 mmol/L    Potassium 3.5 3.5 - 5.5 mmol/L    Chloride 109 100 - 111 mmol/L    CO2 27 21 - 32 mmol/L    Anion gap 6 3.0 - 18 mmol/L    Glucose 99 74 - 99 mg/dL    BUN 8 7.0 - 18 MG/DL    Creatinine 0.71 0.6 - 1.3 MG/DL    BUN/Creatinine ratio 11 (L) 12 - 20      GFR est AA >60 >60 ml/min/1.73m2    GFR est non-AA >60 >60 ml/min/1.73m2    Calcium 8.8 8.5 - 10.1 MG/DL    Bilirubin, total 0.3 0.2 - 1.0 MG/DL    ALT (SGPT) 18 13 - 56 U/L    AST (SGOT) 12 10 - 38 U/L    Alk. phosphatase 75 45 - 117 U/L    Protein, total 6.8 6.4 - 8.2 g/dL    Albumin 3.9 3.4 - 5.0 g/dL    Globulin 2.9 2.0 - 4.0 g/dL    A-G Ratio 1.3 0.8 - 1.7         Radiologic Studies:   CT ABD PELV W CONT   Final Result   IMPRESSION:      No acute process        CT Results  (Last 48 hours)               02/17/20 1955  CT ABD PELV W CONT Final result    Impression:  IMPRESSION:       No acute process       Narrative:  EXAM: CT of the abdomen and pelvis       INDICATION: Lower abdominal pain, vaginal pain radiating to the rectum       COMPARISON: September 14, 2018       TECHNIQUE: Axial CT imaging of the abdomen and pelvis was performed with   intravenous contrast. Multiplanar reformats were generated. One or more dose   reduction techniques were used on this CT: automated exposure control,   adjustment of the mAs and/or kVp according to patient size, and iterative   reconstruction techniques.   The specific techniques used on this CT exam have   been documented in the patient's electronic medical record. Digital Imaging and   Communications in Medicine (DICOM) format image data are available to   nonaffiliated external healthcare facilities or entities on a secure, media   free, reciprocally searchable basis with patient authorization for at least a   12-month period after this study. _______________       FINDINGS:       LOWER CHEST: Unremarkable. LIVER, BILIARY: Liver is normal. No biliary dilation. Gallbladder is   unremarkable. PANCREAS: Normal.       SPLEEN: Normal.       ADRENALS: Normal.       KIDNEYS: There is a 5.5 mm cyst in lower pole the left kidney. Kidneys are   unremarkable otherwise. VASCULATURE: Unremarkable       LYMPH NODES: No enlarged lymph nodes. GASTROINTESTINAL TRACT: No bowel dilation or wall thickening. Appendix is   normal.       PELVIC ORGANS: Unremarkable. BONES: No acute or aggressive osseous abnormalities identified. OTHER: None.       _______________               CXR Results  (Last 48 hours)    None          Medical Decision Making   I am the first provider for this patient. I reviewed the vital signs, available nursing notes, past medical history, past surgical history, family history and social history. Vital Signs: Reviewed the patient's vital signs. Pulse Oximetry Analysis: 100% on RA       Records Reviewed: Nursing Notes and Old Medical Records    Procedures:  Pelvic Exam  Date/Time: 2/17/2020 5:47 PM  Performed by: PA  Procedure duration:  5 minutes. Exam assisted by:  Zaki Latif RN. Type of exam performed: bimanual and speculum. External genitalia appearance: normal.    Vaginal exam:  normal.    Cervical exam:  normal.    Specimen(s) collected:  chlamydia, GC and vaginal culture. Bimanual exam:  uterine tenderness. Patient tolerance: Patient tolerated the procedure well with no immediate complications           ED Course:   5:25 PM Initial assessment performed.  The patients presenting problems have been discussed, and they are in agreement with the care plan formulated and outlined with them. I have encouraged them to ask questions as they arise throughout their visit. Discussion:  Pt presents with vaginal rectal pain, worse after intercourse. Exam benign. No adnexal tenderness on pelvic exam.  Labs within normal limits. CT shows no acute process. Doubt ovarian torsion or TOA. Will have patient follow-up with her OB/GYN and give ibuprofen for pain. Patient understands and agrees with plan. Strict return precautions given, pt offering no questions or complaints. Diagnosis and Disposition     DISCHARGE NOTE:  Rahul Winslow  results have been reviewed with her. She has been counseled regarding her diagnosis, treatment, and plan. She verbally conveys understanding and agreement of the signs, symptoms, diagnosis, treatment and prognosis and additionally agrees to follow up as discussed. She also agrees with the care-plan and conveys that all of her questions have been answered. I have also provided discharge instructions for her that include: educational information regarding their diagnosis and treatment, and list of reasons why they would want to return to the ED prior to their follow-up appointment, should her condition change. She has been provided with education for proper emergency department utilization. CLINICAL IMPRESSION:    1. Pelvic pain        PLAN:  1. D/C Home  2. Discharge Medication List as of 2/17/2020  8:45 PM      START taking these medications    Details   ibuprofen (MOTRIN) 800 mg tablet Take 1 Tab by mouth every six (6) hours as needed for Pain for up to 7 days. , Print, Disp-20 Tab, R-0         CONTINUE these medications which have NOT CHANGED    Details   propranolol (INDERAL) 20 mg tablet Take  by mouth three (3) times daily. , Historical Med      budesonide-formoterol (SYMBICORT) 160-4.5 mcg/actuation HFAA Take 2 Puffs by inhalation two (2) times a day., Historical Med      tiotropium (SPIRIVA WITH HANDIHALER) 18 mcg inhalation capsule Take 1 Cap by inhalation daily. , Historical Med      SUMAtriptan (IMITREX) 50 mg tablet Take 50 mg by mouth once as needed. Historical Med, 50 mg      albuterol (PROVENTIL HFA, VENTOLIN HFA, PROAIR HFA) 90 mcg/actuation inhaler Take 2 Puffs by inhalation every four (4) hours as needed for Wheezing or Shortness of Breath., Print, Disp-1 Inhaler, R-1      albuterol (ACCUNEB) 1.25 mg/3 mL nebu Take 3 mL by inhalation every four (4) hours as needed (wheezing). , Print, Disp-25 Each, R-0           3. Follow-up Information     Follow up With Specialties Details Why Contact Info    Doris Nolasco MD Obstetrics & Gynecology, Gynecology, Obstetrics  call for follow up and recheck  [address]. [address] [address] [phone]      [facility] EMERGENCY DEPT Emergency Medicine  If symptoms worsen [address] [address] [phone]            Please note that this dictation was completed with [employer], the computer voice recognition software. Quite often unanticipated grammatical, syntax, homophones, and other interpretive errors are inadvertently transcribed by the computer software. Please disregard these errors. Please excuse any errors that have escaped final proofreading.

## 2020-02-17 NOTE — ED TRIAGE NOTES
Patient with complaints of vaginal pain that radiates to the rectum. Patient states that the pain is worse with intercourse.  Patient states that she had a similar pain when she had an ovarian cyst.

## 2020-02-18 NOTE — DISCHARGE INSTRUCTIONS
Pelvic Pain: Care Instructions  Your Care Instructions    Pelvic pain, or pain in the lower belly, can have many causes. Often pelvic pain is not serious and gets better in a few days. If your pain continues or gets worse, you may need tests and treatment. Tell your doctor about any new symptoms. These may be signs of a serious problem. Follow-up care is a key part of your treatment and safety. Be sure to make and go to all appointments, and call your doctor if you are having problems. It's also a good idea to know your test results and keep a list of the medicines you take. How can you care for yourself at home? · Rest until you feel better. Lie down, and raise your legs by placing a pillow under your knees. · Drink plenty of fluids. You may find that small, frequent sips are easier on your stomach than if you drink a lot at once. Avoid drinks with carbonation or caffeine, such as soda pop, tea, or coffee. · Try eating several small meals instead of 2 or 3 large ones. Eat mild foods, such as rice, dry toast or crackers, bananas, and applesauce. Avoid fatty and spicy foods, other fruits, and alcohol until 48 hours after your symptoms have gone away. · Take an over-the-counter pain medicine, such as acetaminophen (Tylenol), ibuprofen (Advil, Motrin), or naproxen (Aleve). Read and follow all instructions on the label. · Do not take two or more pain medicines at the same time unless the doctor told you to. Many pain medicines have acetaminophen, which is Tylenol. Too much acetaminophen (Tylenol) can be harmful. · You can put a heating pad, a warm cloth, or moist heat on your belly to relieve pain. When should you call for help?   Call your doctor now or seek immediate medical care if:    · You have a new or higher fever.     · You have unusual vaginal bleeding.     · You have new or worse belly or pelvic pain.     · You have vaginal discharge that has increased in amount or smells bad.    Watch closely for changes in your health, and be sure to contact your doctor if:    · You do not get better as expected. Where can you learn more? Go to http://ralph-júnior.info/. Enter [phone] in the search box to learn more about \"Pelvic Pain: Care Instructions. \"  Current as of: February 19, 2019  Content Version: 12.2  © 0570-7678 [employer]. Care instructions adapted under license by [employer] (which disclaims liability or warranty for this information). If you have questions about a medical condition or this instruction, always ask your healthcare professional. [place] [address] any warranty or liability for your use of this information.

## 2022-03-18 PROBLEM — R35.1 NOCTURIA: Status: ACTIVE | Noted: 2017-07-06

## 2022-03-18 PROBLEM — M62.89 PELVIC FLOOR DYSFUNCTION: Status: ACTIVE | Noted: 2017-10-02

## 2022-03-19 PROBLEM — G89.29 CHRONIC LOW BACK PAIN: Status: ACTIVE | Noted: 2017-07-05

## 2022-03-19 PROBLEM — M54.50 CHRONIC LOW BACK PAIN: Status: ACTIVE | Noted: 2017-07-05

## 2022-03-19 PROBLEM — N39.3 SUI (STRESS URINARY INCONTINENCE, FEMALE): Status: ACTIVE | Noted: 2017-07-06

## 2022-03-19 PROBLEM — Z87.440 HISTORY OF RECURRENT UTIS: Status: ACTIVE | Noted: 2017-07-06

## 2022-03-19 PROBLEM — N32.81 OAB (OVERACTIVE BLADDER): Status: ACTIVE | Noted: 2017-07-06

## 2022-03-19 PROBLEM — N28.1 RENAL CYST, ACQUIRED, LEFT: Status: ACTIVE | Noted: 2017-07-05

## 2022-03-19 PROBLEM — R35.0 URINARY FREQUENCY: Status: ACTIVE | Noted: 2017-07-06

## 2022-03-20 PROBLEM — R39.15 URGENCY OF URINATION: Status: ACTIVE | Noted: 2017-08-10

## 2022-03-20 PROBLEM — R39.14 FEELING OF INCOMPLETE BLADDER EMPTYING: Status: ACTIVE | Noted: 2017-07-06

## 2024-09-30 ENCOUNTER — APPOINTMENT (OUTPATIENT)
Facility: HOSPITAL | Age: 34
End: 2024-09-30
Payer: MEDICAID

## 2024-09-30 ENCOUNTER — HOSPITAL ENCOUNTER (EMERGENCY)
Facility: HOSPITAL | Age: 34
Discharge: HOME | End: 2024-10-01
Attending: EMERGENCY MEDICINE
Payer: MEDICAID

## 2024-09-30 DIAGNOSIS — J45.901 EXACERBATION OF ASTHMA, UNSPECIFIED ASTHMA SEVERITY, UNSPECIFIED WHETHER PERSISTENT: Primary | ICD-10-CM

## 2024-09-30 PROCEDURE — 84484 ASSAY OF TROPONIN QUANT: CPT

## 2024-09-30 PROCEDURE — 99285 EMERGENCY DEPT VISIT HI MDM: CPT

## 2024-09-30 PROCEDURE — 80053 COMPREHEN METABOLIC PANEL: CPT

## 2024-09-30 PROCEDURE — 85025 COMPLETE CBC W/AUTO DIFF WBC: CPT

## 2024-09-30 PROCEDURE — 83735 ASSAY OF MAGNESIUM: CPT

## 2024-09-30 PROCEDURE — 71045 X-RAY EXAM CHEST 1 VIEW: CPT

## 2024-09-30 PROCEDURE — 94640 AIRWAY INHALATION TREATMENT: CPT

## 2024-09-30 RX ORDER — IPRATROPIUM BROMIDE AND ALBUTEROL SULFATE 2.5; .5 MG/3ML; MG/3ML
1 SOLUTION RESPIRATORY (INHALATION) ONCE
Status: COMPLETED | OUTPATIENT
Start: 2024-10-01 | End: 2024-10-01

## 2024-09-30 RX ORDER — MAGNESIUM SULFATE IN WATER 40 MG/ML
2000 INJECTION, SOLUTION INTRAVENOUS ONCE
Status: COMPLETED | OUTPATIENT
Start: 2024-10-01 | End: 2024-10-01

## 2024-09-30 RX ORDER — 0.9 % SODIUM CHLORIDE 0.9 %
1000 INTRAVENOUS SOLUTION INTRAVENOUS ONCE
Status: COMPLETED | OUTPATIENT
Start: 2024-10-01 | End: 2024-10-01

## 2024-10-01 VITALS
SYSTOLIC BLOOD PRESSURE: 99 MMHG | HEART RATE: 96 BPM | RESPIRATION RATE: 26 BRPM | OXYGEN SATURATION: 98 % | TEMPERATURE: 98.7 F | DIASTOLIC BLOOD PRESSURE: 60 MMHG

## 2024-10-01 LAB
ALBUMIN SERPL-MCNC: 3.4 G/DL (ref 3.4–5)
ALBUMIN/GLOB SERPL: 1.2 (ref 0.8–1.7)
ALP SERPL-CCNC: 81 U/L (ref 45–117)
ALT SERPL-CCNC: 14 U/L (ref 13–56)
ANION GAP SERPL CALC-SCNC: 6 MMOL/L (ref 3–18)
AST SERPL-CCNC: 13 U/L (ref 10–38)
BASOPHILS # BLD: 0 K/UL (ref 0–0.1)
BASOPHILS NFR BLD: 0 % (ref 0–2)
BILIRUB SERPL-MCNC: 0.5 MG/DL (ref 0.2–1)
BUN SERPL-MCNC: 14 MG/DL (ref 7–18)
BUN/CREAT SERPL: 19 (ref 12–20)
CALCIUM SERPL-MCNC: 9.2 MG/DL (ref 8.5–10.1)
CHLORIDE SERPL-SCNC: 106 MMOL/L (ref 100–111)
CO2 SERPL-SCNC: 27 MMOL/L (ref 21–32)
CREAT SERPL-MCNC: 0.72 MG/DL (ref 0.6–1.3)
DIFFERENTIAL METHOD BLD: ABNORMAL
EKG ATRIAL RATE: 65 BPM
EKG DIAGNOSIS: NORMAL
EKG P AXIS: 12 DEGREES
EKG P-R INTERVAL: 114 MS
EKG Q-T INTERVAL: 420 MS
EKG QRS DURATION: 82 MS
EKG QTC CALCULATION (BAZETT): 436 MS
EKG R AXIS: 65 DEGREES
EKG T AXIS: 50 DEGREES
EKG VENTRICULAR RATE: 65 BPM
EOSINOPHIL # BLD: 0.2 K/UL (ref 0–0.4)
EOSINOPHIL NFR BLD: 2 % (ref 0–5)
ERYTHROCYTE [DISTWIDTH] IN BLOOD BY AUTOMATED COUNT: 13.5 % (ref 11.6–14.5)
FLUAV RNA SPEC QL NAA+PROBE: NOT DETECTED
FLUBV RNA SPEC QL NAA+PROBE: NOT DETECTED
GLOBULIN SER CALC-MCNC: 2.9 G/DL (ref 2–4)
GLUCOSE SERPL-MCNC: 106 MG/DL (ref 74–99)
HCT VFR BLD AUTO: 46.1 % (ref 35–45)
HGB BLD-MCNC: 15.8 G/DL (ref 12–16)
IMM GRANULOCYTES # BLD AUTO: 0.1 K/UL (ref 0–0.04)
IMM GRANULOCYTES NFR BLD AUTO: 0 % (ref 0–0.5)
LYMPHOCYTES # BLD: 3 K/UL (ref 0.9–3.6)
LYMPHOCYTES NFR BLD: 27 % (ref 21–52)
MAGNESIUM SERPL-MCNC: 2 MG/DL (ref 1.6–2.6)
MCH RBC QN AUTO: 33.8 PG (ref 24–34)
MCHC RBC AUTO-ENTMCNC: 34.3 G/DL (ref 31–37)
MCV RBC AUTO: 98.7 FL (ref 78–100)
MONOCYTES # BLD: 0.6 K/UL (ref 0.05–1.2)
MONOCYTES NFR BLD: 6 % (ref 3–10)
NEUTS SEG # BLD: 7.3 K/UL (ref 1.8–8)
NEUTS SEG NFR BLD: 65 % (ref 40–73)
NRBC # BLD: 0 K/UL (ref 0–0.01)
NRBC BLD-RTO: 0 PER 100 WBC
PLATELET # BLD AUTO: 297 K/UL (ref 135–420)
PMV BLD AUTO: 9.5 FL (ref 9.2–11.8)
POTASSIUM SERPL-SCNC: 3.4 MMOL/L (ref 3.5–5.5)
PROT SERPL-MCNC: 6.3 G/DL (ref 6.4–8.2)
RBC # BLD AUTO: 4.67 M/UL (ref 4.2–5.3)
SARS-COV-2 RNA RESP QL NAA+PROBE: NOT DETECTED
SODIUM SERPL-SCNC: 139 MMOL/L (ref 136–145)
SOURCE: NORMAL
TROPONIN I SERPL HS-MCNC: 3 NG/L (ref 0–54)
WBC # BLD AUTO: 11.2 K/UL (ref 4.6–13.2)

## 2024-10-01 PROCEDURE — 96366 THER/PROPH/DIAG IV INF ADDON: CPT

## 2024-10-01 PROCEDURE — 93005 ELECTROCARDIOGRAM TRACING: CPT | Performed by: EMERGENCY MEDICINE

## 2024-10-01 PROCEDURE — 2580000003 HC RX 258: Performed by: EMERGENCY MEDICINE

## 2024-10-01 PROCEDURE — 96375 TX/PRO/DX INJ NEW DRUG ADDON: CPT

## 2024-10-01 PROCEDURE — 87636 SARSCOV2 & INF A&B AMP PRB: CPT

## 2024-10-01 PROCEDURE — 96365 THER/PROPH/DIAG IV INF INIT: CPT

## 2024-10-01 PROCEDURE — 6360000002 HC RX W HCPCS: Performed by: EMERGENCY MEDICINE

## 2024-10-01 PROCEDURE — 6370000000 HC RX 637 (ALT 250 FOR IP): Performed by: EMERGENCY MEDICINE

## 2024-10-01 RX ORDER — ALBUTEROL SULFATE 90 UG/1
2 INHALANT RESPIRATORY (INHALATION) 4 TIMES DAILY PRN
Qty: 18 G | Refills: 0 | Status: SHIPPED | OUTPATIENT
Start: 2024-10-01

## 2024-10-01 RX ORDER — PREDNISONE 50 MG/1
50 TABLET ORAL DAILY
Qty: 5 TABLET | Refills: 0 | Status: SHIPPED | OUTPATIENT
Start: 2024-10-01 | End: 2024-10-06

## 2024-10-01 RX ADMIN — SODIUM CHLORIDE 1000 ML: 9 INJECTION, SOLUTION INTRAVENOUS at 00:35

## 2024-10-01 RX ADMIN — MAGNESIUM SULFATE HEPTAHYDRATE 2000 MG: 40 INJECTION, SOLUTION INTRAVENOUS at 00:43

## 2024-10-01 RX ADMIN — WATER 125 MG: 1 INJECTION INTRAMUSCULAR; INTRAVENOUS; SUBCUTANEOUS at 00:36

## 2024-10-01 RX ADMIN — IPRATROPIUM BROMIDE AND ALBUTEROL SULFATE 1 DOSE: .5; 3 SOLUTION RESPIRATORY (INHALATION) at 00:37

## 2024-10-01 NOTE — ED PROVIDER NOTES
[facility] EMERGENCY DEPT  EMERGENCY DEPARTMENT ENCOUNTER    Patient Name: Katarina Hardwick  MRN: [id number]  YOB: 1990  Provider: Ventura Hugo MD  PCP: Ernesto Matthews MD   Time/Date of evaluation: 11:31 PM EDT on 9/30/24    History of Presenting Illness     History Provided by: Patient  History is limited by: Nothing     HISTORY:   Katarina Hardwick is a 33 y.o. female presenting with shortness of breath.  This been going on for about a week.  She does have some sharp chest pain with deep inspiration and coughing.  She has a cough of this is been nonproductive.  She went to urgent care about a week ago shortly after symptoms started.  She described an albuterol inhaler, steroids and an antibiotic which she has finished all of.  She does have asthma but has not had to be admitted to the hospital in at least a decade.  No fevers.  No vomiting or diarrhea.  No abdominal pain.    Nursing Notes were all reviewed and agreed with or any disagreements were addressed in the HPI.    Past History     PAST MEDICAL HISTORY:  Past Medical History:   Diagnosis Date    Asthma     Chronic low back pain 7/5/2017    Lung abnormality     Migraine     Other and unspecified ovarian cysts     Renal cyst, acquired, left 7/5/2017       PAST SURGICAL HISTORY:  Past Surgical History:   Procedure Laterality Date    PELVIC LAPAROSCOPY         FAMILY HISTORY:  No family history on file.    SOCIAL HISTORY:  Social History     Tobacco Use    Smoking status: Every Day     Current packs/day: 1.00     Types: Cigarettes    Smokeless tobacco: Never    Tobacco comments:     Quit smoking: uro cancer encouraged cessation   Substance Use Topics    Alcohol use: Yes    Drug use: No       MEDICATIONS:  No current facility-administered medications for this encounter.     Current Outpatient Medications   Medication Sig Dispense Refill    albuterol sulfate HFA (VENTOLIN HFA) 108 (90 Base) MCG/ACT inhaler Inhale 2 puffs into the lungs 4 times

## 2024-10-01 NOTE — ED TRIAGE NOTES
Pt presents to ED with cc of SOB and cough. Pt was dx with bronchitis 1 week ago and finished prednisone and abx. Hx of asthma, used nebs at home with minimal relief.